# Patient Record
Sex: FEMALE | Race: BLACK OR AFRICAN AMERICAN | Employment: UNEMPLOYED | RURAL
[De-identification: names, ages, dates, MRNs, and addresses within clinical notes are randomized per-mention and may not be internally consistent; named-entity substitution may affect disease eponyms.]

---

## 2017-02-09 ENCOUNTER — OFFICE VISIT (OUTPATIENT)
Dept: FAMILY MEDICINE CLINIC | Age: 52
End: 2017-02-09

## 2017-02-09 VITALS
TEMPERATURE: 98.3 F | DIASTOLIC BLOOD PRESSURE: 114 MMHG | SYSTOLIC BLOOD PRESSURE: 180 MMHG | OXYGEN SATURATION: 98 % | RESPIRATION RATE: 18 BRPM | HEIGHT: 61 IN | WEIGHT: 191 LBS | HEART RATE: 75 BPM | BODY MASS INDEX: 36.06 KG/M2

## 2017-02-09 DIAGNOSIS — I10 ESSENTIAL HYPERTENSION: ICD-10-CM

## 2017-02-09 DIAGNOSIS — Q85.00 NEUROFIBROMATOSIS (HCC): ICD-10-CM

## 2017-02-09 DIAGNOSIS — Z12.39 BREAST CANCER SCREENING: Primary | ICD-10-CM

## 2017-02-09 DIAGNOSIS — E03.9 ACQUIRED HYPOTHYROIDISM: ICD-10-CM

## 2017-02-09 DIAGNOSIS — K21.9 GASTROESOPHAGEAL REFLUX DISEASE WITHOUT ESOPHAGITIS: ICD-10-CM

## 2017-02-09 DIAGNOSIS — E11.9 CONTROLLED TYPE 2 DIABETES MELLITUS WITHOUT COMPLICATION, WITHOUT LONG-TERM CURRENT USE OF INSULIN (HCC): ICD-10-CM

## 2017-02-09 DIAGNOSIS — M05.79 RHEUMATOID ARTHRITIS INVOLVING MULTIPLE SITES WITH POSITIVE RHEUMATOID FACTOR (HCC): ICD-10-CM

## 2017-02-09 DIAGNOSIS — E78.2 MIXED HYPERLIPIDEMIA: ICD-10-CM

## 2017-02-09 RX ORDER — METOPROLOL TARTRATE 50 MG/1
50 TABLET ORAL 2 TIMES DAILY
Qty: 60 TAB | Refills: 5 | Status: SHIPPED | OUTPATIENT
Start: 2017-02-09 | End: 2017-08-31 | Stop reason: SDUPTHER

## 2017-02-09 RX ORDER — GABAPENTIN 600 MG/1
600 TABLET ORAL DAILY
Qty: 30 TAB | Refills: 5 | Status: SHIPPED | OUTPATIENT
Start: 2017-02-09 | End: 2017-10-04

## 2017-02-09 RX ORDER — LISINOPRIL AND HYDROCHLOROTHIAZIDE 20; 25 MG/1; MG/1
1 TABLET ORAL 2 TIMES DAILY
Qty: 60 TAB | Refills: 5 | Status: SHIPPED | OUTPATIENT
Start: 2017-02-09 | End: 2017-08-31 | Stop reason: SDUPTHER

## 2017-02-09 NOTE — MR AVS SNAPSHOT
Visit Information Date & Time Provider Department Dept. Phone Encounter #  
 2/9/2017 10:40 AM MD Dany Gutierrez  Primary Care [phone] [id number] Your Appointments 3/9/2017 11:00 AM  
Follow Up with MD Dany Gutierrez Primary Care [facility]) Appt Note: 4 week f/u  
 [address] [facility] [phone] [phone]  
  
   
 [address] [facility] [address] [phone] Upcoming Health Maintenance Date Due Hepatitis C Screening 1965 HEMOGLOBIN A1C Q6M 1965 LIPID PANEL Q1 1964 FOOT EXAM Q1 3/12/1974 MICROALBUMIN Q1 3/12/1974 EYE EXAM RETINAL OR DILATED Q1 3/12/1974 Pneumococcal 19-64 Medium Risk (1 of 1 - PPSV23) 3/12/1983 DTaP/Tdap/Td series (1 - Tdap) 3/12/1985 PAP AKA CERVICAL CYTOLOGY 3/12/1985 BREAST CANCER SCRN MAMMOGRAM 3/12/2014 FOBT Q 1 YEAR AGE 50-75 3/12/2014 INFLUENZA AGE 9 TO ADULT 8/1/2016 Allergies as of 2/9/2017  Review Complete On: 2/9/2017 By: Marisa Garcia RN Severity Noted Reaction Type Reactions Sulfa (Sulfonamide Antibiotics)  06/23/2014    Soni Velasquez [Fesoterodine]  07/25/2016    Other (comments) Chest pain Current Immunizations  Never Reviewed No immunizations on file. Not reviewed this visit You Were Diagnosed With   
  
 Codes Comments Breast cancer screening    -  Primary ICD-10-CM: Z12.39 
ICD-9-CM: V76.10 Essential hypertension     ICD-10-CM: I10 
ICD-9-CM: 401.9 Mixed hyperlipidemia     ICD-10-CM: E78.2 ICD-9-CM: 272.2 Controlled type 2 diabetes mellitus without complication, without long-term current use of insulin ([facility]     ICD-10-CM: E11.9 ICD-9-CM: 250.00 Neurofibromatosis ([facility]     ICD-10-CM: Q85.00 ICD-9-CM: 237.70 Rheumatoid arthritis involving multiple sites with positive rheumatoid factor (HCC)     ICD-10-CM: M05.79 ICD-9-CM: 714.0 Vitals BP Pulse Temp Resp Height(growth percentile) Weight(growth percentile) (!) 180/114 (BP 1 Location: Left arm, BP Patient Position: Sitting) 75 98.3 °F (36.8 °C) (Oral) 18 5' 1\" (1.549 m) 191 lb (86.6 kg) SpO2 BMI OB Status Smoking Status 98% 36.09 kg/m2 Hysterectomy Never Smoker Vitals History BMI and BSA Data Body Mass Index Body Surface Area 36.09 kg/m 2 1.93 m 2 Preferred Pharmacy Pharmacy Name Phone [facility] [address] [place] - [address] [phone] Your Updated Medication List  
  
   
This list is accurate as of: 2/9/17 11:54 AM.  Always use your most recent med list.  
  
  
  
  
 Thersia Brandonane -407-538 mg-mcg-mcg-mcg Tab Generic drug:  Multivit,Ca,Iron-FA-Lyco-Lut Take  by mouth. hydroCHLOROthiazide 25 mg tablet Commonly known as:  HYDRODIURIL Take 25 mg by mouth daily. metoprolol tartrate 50 mg tablet Commonly known as:  LOPRESSOR Take  by mouth daily. pantoprazole 40 mg tablet Commonly known as:  PROTONIX Take 40 mg by mouth daily. traMADol 50 mg tablet Commonly known as:  ULTRAM  
Take 50 mg by mouth every six (6) hours as needed for Pain. VITAMIN D3 1,000 unit Cap Generic drug:  cholecalciferol Take  by mouth. To-Do List   
 04/09/2017 Imaging:  [facility] MAMMO BI SCREENING INCL CAD Introducing [facility]! Jennifer Arroyo introduces [employer] patient portal. Now you can access parts of your medical record, email your doctor's office, and request medication refills online. 1. In your internet browser, go to [email or web address] 2. Click on the First Time User? Click Here link in the Sign In box. You will see the New Member Sign Up page. 3. Enter your [employer] Access Code exactly as it appears below. You will not need to use this code after youve completed the sign-up process.  If you do not sign up before the expiration date, you must request a new code. · [employer] Access Code: [email or web address] Expires: 5/10/2017 11:54 AM 
 
4. Enter the last four digits of your Social Security Number (xxxx) and Date of Birth (mm/dd/yyyy) as indicated and click Submit. You will be taken to the next sign-up page. 5. Create a [employer] ID. This will be your [employer] login ID and cannot be changed, so think of one that is secure and easy to remember. 6. Create a [employer] password. You can change your password at any time. 7. Enter your Password Reset Question and Answer. This can be used at a later time if you forget your password. 8. Enter your e-mail address. You will receive e-mail notification when new information is available in [address] 9. Click Sign Up. You can now view and download portions of your medical record. 10. Click the Download Summary menu link to download a portable copy of your medical information. If you have questions, please visit the Frequently Asked Questions section of the [employer] website. Remember, [employer] is NOT to be used for urgent needs. For medical emergencies, dial 911. Now available from your iPhone and Android! Please provide this summary of care documentation to your next provider. Your primary care clinician is listed as Yoli Stewart. If you have any questions after today's visit, please call [phone].

## 2017-02-10 ENCOUNTER — DOCUMENTATION ONLY (OUTPATIENT)
Dept: FAMILY MEDICINE CLINIC | Age: 52
End: 2017-02-10

## 2017-02-10 NOTE — PROGRESS NOTES
Patient has appt scheduled for mammo on 2-14-17 at 1400 at [facility]. Patient aware of appt info.

## 2017-03-09 ENCOUNTER — OFFICE VISIT (OUTPATIENT)
Dept: FAMILY MEDICINE CLINIC | Age: 52
End: 2017-03-09

## 2017-03-09 VITALS
WEIGHT: 196 LBS | HEIGHT: 61 IN | HEART RATE: 63 BPM | TEMPERATURE: 97.6 F | SYSTOLIC BLOOD PRESSURE: 160 MMHG | RESPIRATION RATE: 16 BRPM | BODY MASS INDEX: 37 KG/M2 | OXYGEN SATURATION: 100 % | DIASTOLIC BLOOD PRESSURE: 96 MMHG

## 2017-03-09 DIAGNOSIS — E53.8 B12 DEFICIENCY: ICD-10-CM

## 2017-03-09 DIAGNOSIS — I10 ESSENTIAL HYPERTENSION: Primary | ICD-10-CM

## 2017-03-09 DIAGNOSIS — M05.79 RHEUMATOID ARTHRITIS INVOLVING MULTIPLE SITES WITH POSITIVE RHEUMATOID FACTOR (HCC): ICD-10-CM

## 2017-03-09 DIAGNOSIS — E55.9 VITAMIN D DEFICIENCY: ICD-10-CM

## 2017-03-09 DIAGNOSIS — E03.9 ACQUIRED HYPOTHYROIDISM: ICD-10-CM

## 2017-03-09 DIAGNOSIS — Q85.00 NEUROFIBROMATOSIS (HCC): ICD-10-CM

## 2017-03-09 DIAGNOSIS — E78.2 MIXED HYPERLIPIDEMIA: ICD-10-CM

## 2017-03-09 DIAGNOSIS — Z12.39 BREAST CANCER SCREENING: ICD-10-CM

## 2017-03-09 DIAGNOSIS — E11.9 CONTROLLED TYPE 2 DIABETES MELLITUS WITHOUT COMPLICATION, WITHOUT LONG-TERM CURRENT USE OF INSULIN (HCC): ICD-10-CM

## 2017-03-09 NOTE — PROGRESS NOTES
Rolo Mar is a 45 y.o. female who presents with the following:  Chief Complaint   Patient presents with    Hypertension     patient states she did not take her meds today    Labs       Hypertension    The history is provided by the patient (The patient is having no chest pain shortness of breath nor edema and her blood pressure is up considerably today but she did not take her medicine as she was fasting for laboratory). Pertinent negatives include no chest pain, no orthopnea, no palpitations, no PND, no headaches, no peripheral edema, no dizziness and no shortness of breath. Labs   Pertinent negatives include no chest pain, no headaches and no shortness of breath. Allergies   Allergen Reactions    Sulfa (Sulfonamide Antibiotics) Hives    Toviaz [Fesoterodine] Other (comments)     Chest pain       Current Outpatient Prescriptions   Medication Sig    lisinopril-hydroCHLOROthiazide (PRINZIDE, ZESTORETIC) 20-25 mg per tablet Take 1 Tab by mouth two (2) times a day.  metoprolol tartrate (LOPRESSOR) 50 mg tablet Take 1 Tab by mouth two (2) times a day.  gabapentin (NEURONTIN) 600 mg tablet Take 1 Tab by mouth daily. At bedtime    pantoprazole (PROTONIX) 40 mg tablet Take 40 mg by mouth daily.  traMADol (ULTRAM) 50 mg tablet Take 50 mg by mouth every six (6) hours as needed for Pain.  Cholecalciferol, Vitamin D3, (VITAMIN D3) 1,000 unit cap Take  by mouth.  Multivit,Ca,Iron-FA-Lyco-Lut (CENTRAL-DANIEL) -876-250 mg-mcg-mcg-mcg tab Take  by mouth. No current facility-administered medications for this visit.         Past Medical History:   Diagnosis Date    Arthritis     Essential hypertension     GERD (gastroesophageal reflux disease)     Hyperlipidemia     Neurofibromatosis ([facility]     Pancreatitis        Past Surgical History:   Procedure Laterality Date    HX CARPAL TUNNEL RELEASE      HX SHOULDER ARTHROSCOPY Bilateral     HX TAY AND BSO         Family History   Problem Relation Age of Onset    Other Mother      neurofibromatosis and MR    Heart Disease Father     Other Brother      MR    Thyroid Cancer Child        Social History     Social History    Marital status:      Spouse name: N/A    Number of children: N/A    Years of education: N/A     Social History Main Topics    Smoking status: Never Smoker    Smokeless tobacco: Never Used    Alcohol use No    Drug use: No    Sexual activity: Not on file     Other Topics Concern    Not on file     Social History Narrative       Review of Systems   Respiratory: Negative for shortness of breath. Cardiovascular: Negative for chest pain, palpitations, orthopnea and PND. Neurological: Negative for dizziness and headaches. Visit Vitals    BP (!) 160/96 (BP 1 Location: Left arm, BP Patient Position: Sitting)    Pulse 63    Temp 97.6 °F (36.4 °C)    Resp 16    Ht 5' 1\" (1.549 m)    Wt 196 lb (88.9 kg)    SpO2 100%    BMI 37.03 kg/m2     Physical Exam   Constitutional: She is oriented to person, place, and time and well-developed, well-nourished, and in no distress. HENT:   Head: Normocephalic and atraumatic. Right Ear: External ear normal.   Left Ear: External ear normal.   Mouth/Throat: Oropharynx is clear and moist.   Eyes: Conjunctivae and EOM are normal. Pupils are equal, round, and reactive to light. Right eye exhibits no discharge. Left eye exhibits no discharge. Neck: Normal range of motion. Neck supple. No tracheal deviation present. No thyromegaly present. Cardiovascular: Normal rate, regular rhythm, normal heart sounds and intact distal pulses. Exam reveals no gallop and no friction rub. No murmur heard. Pulmonary/Chest: Effort normal and breath sounds normal. No respiratory distress. She has no wheezes. She exhibits no tenderness. Abdominal: Soft. Bowel sounds are normal. She exhibits no distension and no mass. There is no tenderness. There is no rebound and no guarding. Musculoskeletal: She exhibits no edema or tenderness. Lymphadenopathy:     She has no cervical adenopathy. Neurological: She is alert and oriented to person, place, and time. She has normal reflexes. No cranial nerve deficit. She exhibits normal muscle tone. Gait normal. Coordination normal.   Skin: Skin is warm and dry. No rash noted. No erythema. No pallor. the patient has a multitude of neurofibromas over all of her skin   Psychiatric: Mood, memory, affect and judgment normal.         ICD-10-CM ICD-9-CM    1. Essential hypertension I10 401.9 LIPID PANEL      METABOLIC PANEL, COMPREHENSIVE   2. Controlled type 2 diabetes mellitus without complication, without long-term current use of insulin (HCC) E11.9 250.00 LIPID PANEL      METABOLIC PANEL, COMPREHENSIVE      HEMOGLOBIN A1C WITH EAG      MICROALBUMIN, UR, RAND W/ MICROALBUMIN/CREA RATIO   3. B12 deficiency E53.8 266.2 VITAMIN B12 & FOLATE   4. Acquired hypothyroidism E03.9 244.9 THYROID PANEL W/TSH   5. Vitamin D deficiency E55.9 268.9    6. Mixed hyperlipidemia E78.2 272.2 LIPID PANEL      METABOLIC PANEL, COMPREHENSIVE   7. Rheumatoid arthritis involving multiple sites with positive rheumatoid factor ([facility]) M05.79 714.0 RHEUMATOID FACTOR, QL      AUGUSTUS, DIRECT, W/REFLEX      CRP, HIGH SENSITIVITY      SED RATE (ESR)   8. Neurofibromatosis ([facility] [address] [facility] Q85.00 237.70    9.  Breast cancer screening Z12.39 V76.10 [facility] MAMMO BI SCREENING INCL CAD       Orders Placed This Encounter    VITAMIN B12 & FOLATE    RHEUMATOID FACTOR, QL    AUGUSTUS, DIRECT, W/REFLEX    CRP, HIGH SENSITIVITY    SED RATE (ESR)       Follow-up Disposition: Not on Nayan Caldera MD

## 2017-03-10 LAB
ALBUMIN SERPL-MCNC: 4.5 G/DL (ref 3.5–5.5)
ALBUMIN/GLOB SERPL: 1.3 {RATIO} (ref 1.1–2.5)
ALP SERPL-CCNC: 56 IU/L (ref 39–117)
ALT SERPL-CCNC: 9 IU/L (ref 0–32)
ANA SER QL: NEGATIVE
AST SERPL-CCNC: 8 IU/L (ref 0–40)
BILIRUB SERPL-MCNC: 0.9 MG/DL (ref 0–1.2)
BUN SERPL-MCNC: 15 MG/DL (ref 6–24)
BUN/CREAT SERPL: 21 (ref 9–23)
CALCIUM SERPL-MCNC: 9.3 MG/DL (ref 8.7–10.2)
CHLORIDE SERPL-SCNC: 99 MMOL/L (ref 96–106)
CHOLEST SERPL-MCNC: 302 MG/DL (ref 100–199)
CO2 SERPL-SCNC: 26 MMOL/L (ref 18–29)
COMMENT, 011824: ABNORMAL
CREAT SERPL-MCNC: 0.72 MG/DL (ref 0.57–1)
CREAT UR-MCNC: NORMAL MG/DL
CRP SERPL HS-MCNC: 10.85 MG/L (ref 0–3)
ERYTHROCYTE [SEDIMENTATION RATE] IN BLOOD BY WESTERGREN METHOD: 49 MM/HR (ref 0–40)
EST. AVERAGE GLUCOSE BLD GHB EST-MCNC: 117 MG/DL
FOLATE SERPL-MCNC: 16.2 NG/ML
FT4I SERPL CALC-MCNC: 2.1 (ref 1.2–4.9)
GLOBULIN SER CALC-MCNC: 3.4 G/DL (ref 1.5–4.5)
GLUCOSE SERPL-MCNC: 95 MG/DL (ref 65–99)
HBA1C MFR BLD: 5.7 % (ref 4.8–5.6)
HDLC SERPL-MCNC: 47 MG/DL
INTERPRETATION, 910389: NORMAL
LDLC SERPL CALC-MCNC: 237 MG/DL (ref 0–99)
MICROALBUMIN UR-MCNC: NORMAL
POTASSIUM SERPL-SCNC: 3.9 MMOL/L (ref 3.5–5.2)
PROT SERPL-MCNC: 7.9 G/DL (ref 6–8.5)
RHEUMATOID FACT SERPL-ACNC: 10.6 IU/ML (ref 0–13.9)
SODIUM SERPL-SCNC: 140 MMOL/L (ref 134–144)
T3RU NFR SERPL: 31 % (ref 24–39)
T4 SERPL-MCNC: 6.7 UG/DL (ref 4.5–12)
TRIGL SERPL-MCNC: 90 MG/DL (ref 0–149)
TSH SERPL DL<=0.005 MIU/L-ACNC: 0.96 UIU/ML (ref 0.45–4.5)
VIT B12 SERPL-MCNC: 596 PG/ML (ref 211–946)
VLDLC SERPL CALC-MCNC: 18 MG/DL (ref 5–40)

## 2017-03-28 NOTE — PROGRESS NOTES
Unable to reach patient. No working numbers at this time. Printed results and info and have mailed to patient.

## 2017-08-31 DIAGNOSIS — I10 ESSENTIAL HYPERTENSION: ICD-10-CM

## 2017-08-31 RX ORDER — LISINOPRIL AND HYDROCHLOROTHIAZIDE 20; 25 MG/1; MG/1
1 TABLET ORAL 2 TIMES DAILY
Qty: 60 TAB | Refills: 5 | Status: SHIPPED | OUTPATIENT
Start: 2017-08-31 | End: 2018-04-20 | Stop reason: SDUPTHER

## 2017-08-31 RX ORDER — METOPROLOL TARTRATE 50 MG/1
50 TABLET ORAL 2 TIMES DAILY
Qty: 60 TAB | Refills: 5 | Status: SHIPPED | OUTPATIENT
Start: 2017-08-31 | End: 2018-04-20 | Stop reason: SDUPTHER

## 2018-04-20 ENCOUNTER — OFFICE VISIT (OUTPATIENT)
Dept: FAMILY MEDICINE CLINIC | Age: 53
End: 2018-04-20

## 2018-04-20 VITALS
RESPIRATION RATE: 18 BRPM | WEIGHT: 187.6 LBS | DIASTOLIC BLOOD PRESSURE: 100 MMHG | HEIGHT: 61 IN | OXYGEN SATURATION: 99 % | BODY MASS INDEX: 35.42 KG/M2 | SYSTOLIC BLOOD PRESSURE: 160 MMHG | HEART RATE: 70 BPM

## 2018-04-20 DIAGNOSIS — E78.2 MIXED HYPERLIPIDEMIA: ICD-10-CM

## 2018-04-20 DIAGNOSIS — I10 ESSENTIAL HYPERTENSION: ICD-10-CM

## 2018-04-20 DIAGNOSIS — E03.9 ACQUIRED HYPOTHYROIDISM: ICD-10-CM

## 2018-04-20 DIAGNOSIS — H90.3 SENSORINEURAL HEARING LOSS (SNHL) OF BOTH EARS: ICD-10-CM

## 2018-04-20 DIAGNOSIS — Z12.39 BREAST CANCER SCREENING: ICD-10-CM

## 2018-04-20 DIAGNOSIS — Z11.59 ENCOUNTER FOR HEPATITIS C SCREENING TEST FOR LOW RISK PATIENT: ICD-10-CM

## 2018-04-20 DIAGNOSIS — E11.9 CONTROLLED TYPE 2 DIABETES MELLITUS WITHOUT COMPLICATION, WITHOUT LONG-TERM CURRENT USE OF INSULIN (HCC): ICD-10-CM

## 2018-04-20 DIAGNOSIS — E66.01 SEVERE OBESITY (BMI 35.0-39.9) WITH COMORBIDITY (HCC): ICD-10-CM

## 2018-04-20 DIAGNOSIS — Q85.00 NEUROFIBROMATOSIS (HCC): ICD-10-CM

## 2018-04-20 DIAGNOSIS — K21.9 GASTROESOPHAGEAL REFLUX DISEASE WITHOUT ESOPHAGITIS: Primary | ICD-10-CM

## 2018-04-20 DIAGNOSIS — M05.79 RHEUMATOID ARTHRITIS INVOLVING MULTIPLE SITES WITH POSITIVE RHEUMATOID FACTOR (HCC): ICD-10-CM

## 2018-04-20 DIAGNOSIS — E55.9 VITAMIN D DEFICIENCY: ICD-10-CM

## 2018-04-20 DIAGNOSIS — E53.8 B12 DEFICIENCY: ICD-10-CM

## 2018-04-20 RX ORDER — LISINOPRIL AND HYDROCHLOROTHIAZIDE 20; 25 MG/1; MG/1
1 TABLET ORAL 2 TIMES DAILY
Qty: 60 TAB | Refills: 5 | Status: SHIPPED | OUTPATIENT
Start: 2018-04-20 | End: 2019-01-18 | Stop reason: SDUPTHER

## 2018-04-20 RX ORDER — GABAPENTIN 600 MG/1
600 TABLET ORAL
Qty: 30 TAB | Refills: 5 | Status: SHIPPED | OUTPATIENT
Start: 2018-04-20 | End: 2018-05-20

## 2018-04-20 RX ORDER — METOPROLOL TARTRATE 50 MG/1
50 TABLET ORAL 2 TIMES DAILY
Qty: 60 TAB | Refills: 5 | Status: SHIPPED | OUTPATIENT
Start: 2018-04-20 | End: 2018-09-16

## 2018-04-20 RX ORDER — OMEPRAZOLE 40 MG/1
40 CAPSULE, DELAYED RELEASE ORAL DAILY
Qty: 90 CAP | Refills: 1 | Status: SHIPPED | OUTPATIENT
Start: 2018-04-20 | End: 2018-09-16

## 2018-04-20 NOTE — PROGRESS NOTES
Desiree Adrian is a 46 y.o. female who presents with the following:  Chief Complaint   Patient presents with    Hypertension    Fatigue     Not on thyroid medicine    GERD    Diabetes    Cholesterol Problem    Arthritis    Neurologic Problem     Neurofibromatosis    Thyroid Problem       Hypertension    The history is provided by the patient (Patient is doing well on her medications although she is now out of her medicine but she is having no chest pain shortness of breath nor edema. ). Pertinent negatives include no chest pain, no orthopnea, no palpitations, no malaise/fatigue, no blurred vision, no headaches, no tinnitus, no dizziness and no shortness of breath. Fatigue   The history is provided by the patient (Patient's aches and pains from her arthritis is keeping her awake at night and additionally she is not taking any thyroid medicine for her hypothyroid condition. ). Pertinent negatives include no chest pain, no abdominal pain, no headaches and no shortness of breath. GERD   The history is provided by the patient (Patient's reflux is generally handled nicely with her omeprazole so she is not having chest pain from this.). Pertinent negatives include no chest pain, no abdominal pain, no headaches and no shortness of breath. Diabetes   The history is provided by the patient (The patient is not having any polyuria or abnormal weight loss but she is not checking her blood sugars either because of finances. ). Pertinent negatives include no chest pain, no abdominal pain, no headaches and no shortness of breath. Cholesterol Problem   The history is provided by the patient (Patient has a history of hyperlipidemia but is currently on no medications but is not having any chest pain. ). Pertinent negatives include no chest pain, no abdominal pain, no headaches and no shortness of breath.    Arthritis   The history is provided by the patient (Patient has both osteoarthritis and rheumatoid arthritis in the aches and pains are keeping her awake at night.). Pertinent negatives include no chest pain, no abdominal pain, no headaches and no shortness of breath. Neurologic Problem   The history is provided by the patient (Patient has carpal tunnel tunnel that is symptomatic even though she has had the surgery. Patient also has neurofibromatosis 1 of which is fairly large on her left heel that is concerning because of her diabetes. ). Pertinent negatives include no shortness of breath, no chest pain and no headaches. Thyroid Problem   The history is provided by the patient (Patient is having fatigue and is on no thyroid medication although there is a history of hypothyroidism. ). Pertinent negatives include no chest pain, no abdominal pain, no headaches and no shortness of breath. Hearing Problem    The history is provided by the patient (Patient is having decreased hearing and worse on the right). Pertinent negatives include no headaches, no hearing loss, no sore throat, no abdominal pain, no cough and no rash. Allergies   Allergen Reactions    Sulfa (Sulfonamide Antibiotics) Hives    Toviaz [Fesoterodine] Other (comments)     Chest pain       Current Outpatient Prescriptions   Medication Sig    metoprolol tartrate (LOPRESSOR) 50 mg tablet Take 1 Tab by mouth two (2) times a day.  lisinopril-hydroCHLOROthiazide (PRINZIDE, ZESTORETIC) 20-25 mg per tablet Take 1 Tab by mouth two (2) times a day.  omeprazole (PRILOSEC) 40 mg capsule Take 1 Cap by mouth daily.  Cholecalciferol, Vitamin D3, (VITAMIN D3) 1,000 unit cap Take  by mouth.  ondansetron (ZOFRAN ODT) 4 mg disintegrating tablet Take 1 Tab by mouth every eight (8) hours as needed for Nausea.  Multivit,Ca,Iron-FA-Lyco-Lut (CENTRAL-DANIEL) -417-250 mg-mcg-mcg-mcg tab Take  by mouth. No current facility-administered medications for this visit.         Past Medical History:   Diagnosis Date    Arthritis     Essential hypertension     GERD (gastroesophageal reflux disease)     Hyperlipidemia     Neurofibromatosis ([facility] [facility]     Pancreatitis        Past Surgical History:   Procedure Laterality Date    HX CARPAL TUNNEL RELEASE      HX SHOULDER ARTHROSCOPY Bilateral     HX TAY AND BSO         Family History   Problem Relation Age of Onset    Other Mother      neurofibromatosis and MR    Heart Disease Father     Other Brother      MR    Thyroid Cancer Child        Social History     Social History    Marital status:      Spouse name: N/A    Number of children: N/A    Years of education: N/A     Social History Main Topics    Smoking status: Never Smoker    Smokeless tobacco: Never Used    Alcohol use No    Drug use: No    Sexual activity: Not Asked     Other Topics Concern    None     Social History Narrative       Review of Systems   Constitutional: Positive for fatigue. Negative for chills, fever, malaise/fatigue and weight loss. HENT: Negative for congestion, hearing loss, sore throat and tinnitus. Eyes: Negative for blurred vision, pain and discharge. Respiratory: Negative for cough, shortness of breath and wheezing. Cardiovascular: Negative for chest pain, palpitations, orthopnea, claudication and leg swelling. Gastrointestinal: Negative for abdominal pain, constipation and heartburn. Genitourinary: Negative for dysuria, frequency and urgency. Musculoskeletal: Negative for falls, joint pain and myalgias. Skin: Negative for itching and rash. Patient's skin are covered with neurofibromas. Neurological: Positive for tingling (Patient is tingling in the central fingers bilaterally from her carpal tunnel). Negative for dizziness, tremors and headaches. Neurofibroma on the left lateral heel is putting pressure on that area giving her some difficulty. Endo/Heme/Allergies: Negative for environmental allergies and polydipsia. Psychiatric/Behavioral: Negative for depression and substance abuse.  The patient is not nervous/anxious. Visit Vitals    BP (!) 160/100 (BP 1 Location: Left arm, BP Patient Position: Sitting)    Pulse 70    Resp 18    Ht 5' 1\" (1.549 m)    Wt 187 lb 9.6 oz (85.1 kg)    SpO2 99%    BMI 35.45 kg/m2     Physical Exam   Constitutional: She is oriented to person, place, and time and well-developed, well-nourished, and in no distress. HENT:   Head: Normocephalic and atraumatic. Right Ear: External ear normal.   Left Ear: External ear normal.   Mouth/Throat: Oropharynx is clear and moist.   Eyes: Conjunctivae and EOM are normal. Pupils are equal, round, and reactive to light. Right eye exhibits no discharge. Left eye exhibits no discharge. Neck: Normal range of motion. Neck supple. No tracheal deviation present. No thyromegaly present. Cardiovascular: Normal rate, regular rhythm, normal heart sounds and intact distal pulses. Exam reveals no gallop and no friction rub. No murmur heard. Pulmonary/Chest: Effort normal and breath sounds normal. No respiratory distress. She has no wheezes. She exhibits no tenderness. Abdominal: Soft. Bowel sounds are normal. She exhibits no distension and no mass. There is no tenderness. There is no rebound and no guarding. Musculoskeletal: She exhibits tenderness (About the wrist and knees). She exhibits no edema. Lymphadenopathy:     She has no cervical adenopathy. Neurological: She is alert and oriented to person, place, and time. She has normal reflexes. No cranial nerve deficit. She exhibits normal muscle tone. Gait normal. Coordination normal.   Skin: Skin is warm and dry. No rash noted. No erythema. No pallor. Patient's skin is covered with 100s of neurofibromas from head to foot   Psychiatric: Mood, memory, affect and judgment normal.         ICD-10-CM ICD-9-CM    1. Gastroesophageal reflux disease without esophagitis K21.9 530.81 omeprazole (PRILOSEC) 40 mg capsule   2.  Essential hypertension I10 401.9 metoprolol tartrate (LOPRESSOR) 50 mg tablet      lisinopril-hydroCHLOROthiazide (PRINZIDE, ZESTORETIC) 20-25 mg per tablet   3. Mixed hyperlipidemia E78.2 272.2    4. Breast cancer screening Z12.31 V76.10 [facility] MAMMO BI SCREENING INCL CAD   5. Severe obesity (BMI 35.0-39. 9) with comorbidity ([facility] E66.01 278.01    6. Controlled type 2 diabetes mellitus without complication, without long-term current use of insulin (HCC) G03.2 156.12 METABOLIC PANEL, COMPREHENSIVE      LIPID PANEL      HEMOGLOBIN A1C WITH EAG      MICROALBUMIN, UR, RAND W/ MICROALB/CREAT RATIO       DIABETES FOOT EXAM   7. Rheumatoid arthritis involving multiple sites with positive rheumatoid factor (HCC) M05.79 714.0 SED RATE (ESR)   8. Neurofibromatosis ([facility] Q85.00 237.70    9. B12 deficiency E53.8 266.2 METHYLMALONIC ACID   10. Acquired hypothyroidism E03.9 244.9 THYROID PANEL W/TSH   11. Vitamin D deficiency E55.9 268.9 VITAMIN D, 1, 25 DIHYDROXY   12. Encounter for hepatitis C screening test for low risk patient Z11.59 V73.89 HEPATITIS C AB       Orders Placed This Encounter    [facility] MAMMO BI SCREENING INCL CAD     Standing Status:   Future     Standing Expiration Date:   5/20/2019     Order Specific Question:   Reason for Exam     Answer:   Breast cancer screening    HEPATITIS C AB    METABOLIC PANEL, COMPREHENSIVE    LIPID PANEL    HEMOGLOBIN A1C WITH EAG    MICROALBUMIN, UR, RAND W/ MICROALB/CREAT RATIO    THYROID PANEL W/TSH    METHYLMALONIC ACID    SED RATE (ESR)    VITAMIN D, 1, 25 DIHYDROXY     DIABETES FOOT EXAM    metoprolol tartrate (LOPRESSOR) 50 mg tablet     Sig: Take 1 Tab by mouth two (2) times a day. Dispense:  60 Tab     Refill:  5    lisinopril-hydroCHLOROthiazide (PRINZIDE, ZESTORETIC) 20-25 mg per tablet     Sig: Take 1 Tab by mouth two (2) times a day. Dispense:  60 Tab     Refill:  5    omeprazole (PRILOSEC) 40 mg capsule     Sig: Take 1 Cap by mouth daily.      Dispense:  90 Cap     Refill:  1   Diabetic foot exam was carried out revealing many neurofibromas all over each foot and including the toes with normal sensation and normal dorsalis pedis bilaterally but a very thin posterior tibial bilaterally. Patient had mild hammertoes bilaterally but no other gross orthopedic deformities. The patient did have mild toenail fungus bilaterally. There were no pre-ulcerative calluses at this time. It is of note that the neurofibroma on the left heel is rather large and may need to be removed to take pressure off this area.     Follow-up Disposition: Not on Ari Helm MD

## 2018-04-20 NOTE — MR AVS SNAPSHOT
[address] 67 [phone] Patient: Janeth Romo MRN: [id number] :1965 Visit Information Date & Time Provider Department Dept. Phone Encounter #  
 2018  7:40 AM Juan Pablo Solis  [address] [facility] [phone] [id number] Upcoming Health Maintenance Date Due Hepatitis C Screening 1965 FOOT EXAM Q1 3/12/1974 EYE EXAM RETINAL OR DILATED Q1 3/12/1974 Pneumococcal 19-64 Medium Risk (1 of 1 - PPSV23) 3/12/1983 DTaP/Tdap/Td series (1 - Tdap) 3/12/1985 PAP AKA CERVICAL CYTOLOGY 3/12/1985 BREAST CANCER SCRN MAMMOGRAM 3/12/2014 FOBT Q 1 YEAR AGE 50-75 3/12/2014 HEMOGLOBIN A1C Q6M 2017 MICROALBUMIN Q1 3/9/2018 LIPID PANEL Q1 3/9/2018 Influenza Age 5 to Adult 2020* *Topic was postponed. The date shown is not the original due date. Allergies as of 2018  Review Complete On: 2018 By: Juan Pablo Solis MD  
  
 Severity Noted Reaction Type Reactions Sulfa (Sulfonamide Antibiotics)  2014    Hives Runell Prosper [Fesoterodine]  2016    Other (comments) Chest pain Current Immunizations  Never Reviewed No immunizations on file. Not reviewed this visit You Were Diagnosed With   
  
 Codes Comments Gastroesophageal reflux disease without esophagitis    -  Primary ICD-10-CM: K21.9 ICD-9-CM: 530.81 Essential hypertension     ICD-10-CM: I10 
ICD-9-CM: 401.9 Mixed hyperlipidemia     ICD-10-CM: E78.2 ICD-9-CM: 272.2 Breast cancer screening     ICD-10-CM: Z12.31 
ICD-9-CM: V76.10 Severe obesity (BMI 35.0-39. 9) with comorbidity ([facility]     ICD-10-CM: E66.01 
ICD-9-CM: 278.01 Controlled type 2 diabetes mellitus without complication, without long-term current use of insulin ([facility]     ICD-10-CM: E11.9 ICD-9-CM: 250.00 Rheumatoid arthritis involving multiple sites with positive rheumatoid factor (HCC)     ICD-10-CM: M05.79 ICD-9-CM: 714.0 Neurofibromatosis ([facility]     ICD-10-CM: Q85.00 ICD-9-CM: 237.70   
 B12 deficiency     ICD-10-CM: E53.8 ICD-9-CM: 266.2 Acquired hypothyroidism     ICD-10-CM: E03.9 ICD-9-CM: 524. 9 Vitamin D deficiency     ICD-10-CM: E55.9 ICD-9-CM: 268.9 Encounter for hepatitis C screening test for low risk patient     ICD-10-CM: Z11.59 
ICD-9-CM: V73.89 Vitals BP Pulse Resp Height(growth percentile) Weight(growth percentile) SpO2  
 (!) 160/100 (BP 1 Location: Left arm, BP Patient Position: Sitting) 70 18 5' 1\" (1.549 m) 187 lb 9.6 oz (85.1 kg) 99% BMI OB Status Smoking Status 35.45 kg/m2 Hysterectomy Never Smoker Vitals History BMI and BSA Data Body Mass Index Body Surface Area  
 35.45 kg/m 2 1.91 m 2 [facility] [address] [phone] Your Updated Medication List  
  
   
This list is accurate as of 4/20/18  8:37 AM.  Always use your most recent med list.  
  
  
  
  
 Abhinav Bose -[phone] mg-mcg-mcg-mcg Tab Generic drug:  Multivit,Ca,Iron-FA-Lyco-Lut Take  by mouth.  
  
 gabapentin 600 mg tablet Commonly known as:  NEURONTIN Take 1 Tab by mouth nightly as needed for up to 30 days. Indications: NEUROPATHIC PAIN  
  
 lisinopril-hydroCHLOROthiazide 20-25 mg per tablet Commonly known as:  Richar Scarlet Take 1 Tab by mouth two (2) times a day. metoprolol tartrate 50 mg tablet Commonly known as:  LOPRESSOR Take 1 Tab by mouth two (2) times a day. omeprazole 40 mg capsule Commonly known as:  PRILOSEC Take 1 Cap by mouth daily. ondansetron 4 mg disintegrating tablet Commonly known as:  ZOFRAN ODT Take 1 Tab by mouth every eight (8) hours as needed for Nausea. VITAMIN D3 1,000 unit Cap Generic drug:  cholecalciferol Take  by mouth. Prescriptions Sent to Pharmacy Refills  
 metoprolol tartrate (LOPRESSOR) 50 mg tablet 5 Sig: Take 1 Tab by mouth two (2) times a day. Class: Normal  
 Pharmacy: [address] Ph #: [phone] Route: Oral  
 lisinopril-hydroCHLOROthiazide (PRINZIDE, ZESTORETIC) 20-25 mg per tablet 5 Sig: Take 1 Tab by mouth two (2) times a day. Class: Normal  
 Pharmacy: [address] Ph #: [phone] Route: Oral  
 omeprazole (PRILOSEC) 40 mg capsule 1 Sig: Take 1 Cap by mouth daily. Class: Normal  
 Pharmacy: [address], [address] [facility] [address] Ph #: [phone] Route: Oral  
 gabapentin (NEURONTIN) 600 mg tablet 5 Sig: Take 1 Tab by mouth nightly as needed for up to 30 days. Indications: NEUROPATHIC PAIN Class: Normal  
 Pharmacy: [address] Ph #: [phone] Route: Oral  
  
We Performed the Following HEMOGLOBIN A1C WITH EAG [20144 CPT(R)] HEPATITIS C AB [73500 CPT(R)]  DIABETES FOOT EXAM [HM7 Custom] LIPID PANEL [43333 CPT(R)] METABOLIC PANEL, COMPREHENSIVE [67438 CPT(R)] METHYLMALONIC ACID [88032 CPT(R)] MICROALBUMIN, UR, RAND W/ MICROALB/CREAT RATIO [id number] CPT(R)] SED RATE (ESR) G9192787 CPT(R)] THYROID PANEL W/TSH [31012 CPT(R)] VITAMIN D, 1, 25 DIHYDROXY [99043 CPT(R)] To-Do List   
 07/20/2018 Imaging:  KYE MAMMO BI SCREENING INCL CAD Introducing [facility]! [facility] introduces Loopback patient portal. Now you can access parts of your medical record, email your doctor's office, and request medication refills online. 1. In your internet browser, go to [email or web address] 2. Click on the First Time User? Click Here link in the Sign In box. You will see the New Member Sign Up page. 3. Enter your Freespee Access Code exactly as it appears below. You will not need to use this code after youve completed the sign-up process. If you do not sign up before the expiration date, you must request a new code. · Freespee Access Code: [email or web address] Expires: 7/8/2018  3:21 PM 
 
4. Enter the last four digits of your Social Security Number (xxxx) and Date of Birth (mm/dd/yyyy) as indicated and click Submit. You will be taken to the next sign-up page. 5. Create a Freespee ID. This will be your Freespee login ID and cannot be changed, so think of one that is secure and easy to remember. 6. Create a Freespee password. You can change your password at any time. 7. Enter your Password Reset Question and Answer. This can be used at a later time if you forget your password. 8. Enter your e-mail address. You will receive e-mail notification when new information is available in [address] 9. Click Sign Up. You can now view and download portions of your medical record. 10. Click the Download Summary menu link to download a portable copy of your medical information. If you have questions, please visit the Frequently Asked Questions section of the Freespee website. Remember, Freespee is NOT to be used for urgent needs. For medical emergencies, dial 911. Now available from your iPhone and Android! Please provide this summary of care documentation to your next provider. Your primary care clinician is listed as Dale Quintero. If you have any questions after today's visit, please call [phone].

## 2018-04-23 ENCOUNTER — TELEPHONE (OUTPATIENT)
Dept: FAMILY MEDICINE CLINIC | Age: 53
End: 2018-04-23

## 2018-04-23 DIAGNOSIS — K21.9 GASTROESOPHAGEAL REFLUX DISEASE WITHOUT ESOPHAGITIS: Primary | ICD-10-CM

## 2018-04-23 RX ORDER — RANITIDINE 150 MG/1
150 TABLET, FILM COATED ORAL 2 TIMES DAILY
Qty: 60 TAB | Refills: 5 | Status: SHIPPED | OUTPATIENT
Start: 2018-04-23 | End: 2018-09-16

## 2018-04-26 LAB
1,25(OH)2D3 SERPL-MCNC: 62.6 PG/ML (ref 19.9–79.3)
ALBUMIN SERPL-MCNC: 4.3 G/DL (ref 3.5–5.5)
ALBUMIN/CREAT UR: 27.1 MG/G CREAT (ref 0–30)
ALBUMIN/GLOB SERPL: 1.3 {RATIO} (ref 1.2–2.2)
ALP SERPL-CCNC: 49 IU/L (ref 39–117)
ALT SERPL-CCNC: 12 IU/L (ref 0–32)
AST SERPL-CCNC: 14 IU/L (ref 0–40)
BILIRUB SERPL-MCNC: 0.5 MG/DL (ref 0–1.2)
BUN SERPL-MCNC: 10 MG/DL (ref 6–24)
BUN/CREAT SERPL: 16 (ref 9–23)
CALCIUM SERPL-MCNC: 9.5 MG/DL (ref 8.7–10.2)
CHLORIDE SERPL-SCNC: 101 MMOL/L (ref 96–106)
CHOLEST SERPL-MCNC: 282 MG/DL (ref 100–199)
CO2 SERPL-SCNC: 27 MMOL/L (ref 18–29)
COMMENT, 011824: ABNORMAL
CREAT SERPL-MCNC: 0.61 MG/DL (ref 0.57–1)
CREAT UR-MCNC: 68 MG/DL
ERYTHROCYTE [SEDIMENTATION RATE] IN BLOOD BY WESTERGREN METHOD: 24 MM/HR (ref 0–40)
EST. AVERAGE GLUCOSE BLD GHB EST-MCNC: 111 MG/DL
FT4I SERPL CALC-MCNC: 2 (ref 1.2–4.9)
GFR SERPLBLD CREATININE-BSD FMLA CKD-EPI: 103 ML/MIN/1.73
GFR SERPLBLD CREATININE-BSD FMLA CKD-EPI: 119 ML/MIN/1.73
GLOBULIN SER CALC-MCNC: 3.2 G/DL (ref 1.5–4.5)
GLUCOSE SERPL-MCNC: 92 MG/DL (ref 65–99)
HBA1C MFR BLD: 5.5 % (ref 4.8–5.6)
HCV AB S/CO SERPL IA: <0.1 S/CO RATIO (ref 0–0.9)
HDLC SERPL-MCNC: 57 MG/DL
INTERPRETATION, 910389: NORMAL
LDLC SERPL CALC-MCNC: 209 MG/DL (ref 0–99)
METHYLMALONATE SERPL-SCNC: 77 NMOL/L (ref 0–378)
MICROALBUMIN UR-MCNC: 18.4 UG/ML
POTASSIUM SERPL-SCNC: 3.8 MMOL/L (ref 3.5–5.2)
PROT SERPL-MCNC: 7.5 G/DL (ref 6–8.5)
SODIUM SERPL-SCNC: 140 MMOL/L (ref 134–144)
T3RU NFR SERPL: 35 % (ref 24–39)
T4 SERPL-MCNC: 5.8 UG/DL (ref 4.5–12)
TRIGL SERPL-MCNC: 82 MG/DL (ref 0–149)
TSH SERPL DL<=0.005 MIU/L-ACNC: 0.96 UIU/ML (ref 0.45–4.5)
VLDLC SERPL CALC-MCNC: 16 MG/DL (ref 5–40)

## 2018-05-04 DIAGNOSIS — Z12.39 SCREENING BREAST EXAMINATION: Primary | ICD-10-CM

## 2018-08-05 ENCOUNTER — TELEPHONE (OUTPATIENT)
Dept: FAMILY MEDICINE CLINIC | Age: 53
End: 2018-08-05

## 2019-01-10 DIAGNOSIS — I10 ESSENTIAL HYPERTENSION: ICD-10-CM

## 2019-01-10 RX ORDER — LISINOPRIL AND HYDROCHLOROTHIAZIDE 20; 25 MG/1; MG/1
TABLET ORAL
Qty: 60 TAB | Refills: 5 | OUTPATIENT
Start: 2019-01-10

## 2019-01-15 NOTE — TELEPHONE ENCOUNTER
Pt needs to be seen in the office before receiving any refills. She no-showed to her appt yesterday. Is she able to reschedule?

## 2019-01-18 PROBLEM — E66.01 SEVERE OBESITY (BMI 35.0-39.9) WITH COMORBIDITY (HCC): Status: RESOLVED | Noted: 2018-04-20 | Resolved: 2019-01-18

## 2019-01-18 PROBLEM — E03.9 ACQUIRED HYPOTHYROIDISM: Status: RESOLVED | Noted: 2018-04-20 | Resolved: 2019-01-18

## 2019-09-25 PROBLEM — Z12.39 BREAST CANCER SCREENING: Status: RESOLVED | Noted: 2017-02-09 | Resolved: 2019-09-25

## 2021-03-29 ENCOUNTER — APPOINTMENT (OUTPATIENT)
Dept: GENERAL RADIOLOGY | Age: 56
End: 2021-03-29
Attending: EMERGENCY MEDICINE

## 2021-03-29 ENCOUNTER — HOSPITAL ENCOUNTER (EMERGENCY)
Age: 56
Discharge: HOME | End: 2021-03-29
Attending: EMERGENCY MEDICINE

## 2021-03-29 VITALS
DIASTOLIC BLOOD PRESSURE: 79 MMHG | OXYGEN SATURATION: 98 % | WEIGHT: 181 LBS | SYSTOLIC BLOOD PRESSURE: 116 MMHG | HEIGHT: 61 IN | TEMPERATURE: 98.2 F | BODY MASS INDEX: 34.17 KG/M2 | HEART RATE: 81 BPM | RESPIRATION RATE: 18 BRPM
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
## 2021-03-29 DIAGNOSIS — S30.0XXA CONTUSION OF LOWER BACK AND PELVIS, INITIAL ENCOUNTER: ICD-10-CM

## 2021-03-29 PROCEDURE — 99282 EMERGENCY DEPT VISIT SF MDM: CPT

## 2021-03-29 PROCEDURE — 73502 X-RAY EXAM HIP UNI 2-3 VIEWS: CPT

## 2021-03-29 PROCEDURE — 72100 X-RAY EXAM L-S SPINE 2/3 VWS: CPT

## 2021-03-29 RX ORDER — NAPROXEN 500 MG/1
500 TABLET ORAL
Qty: 20 TAB | Refills: 0 | Status: SHIPPED | OUTPATIENT
Start: 2021-03-29 | End: 2021-04-07

## 2021-03-29 RX ORDER — CYCLOBENZAPRINE HCL 10 MG
10 TABLET ORAL
Qty: 30 TAB | Refills: 0 | Status: SHIPPED | OUTPATIENT
Start: 2021-03-29 | End: 2021-04-07

## 2021-03-29 RX ORDER — METHYLPREDNISOLONE 4 MG/1
TABLET ORAL
Qty: 1 DOSE PACK | Refills: 0 | Status: SHIPPED | OUTPATIENT
Start: 2021-03-29 | End: 2021-04-07

## 2021-03-29 NOTE — ED NOTES
Discharge instructions are reviewed with patient who denies questions or concerns. Leaves amb to care of self.

## 2021-03-29 NOTE — LETTER
[facility] EMERGENCY DEP 
[address] [id number] 
[phone] Work/School Note Date: 3/29/2021 To Whom It May concern: 
 
Marianne Bob was seen and treated today in the emergency room by the following provider(s): 
Attending Provider: Kevin Velázquez MD.   
 
Kortney Ramsay may return to work on 4/5/21. Sincerely, Fausto Gama MD

## 2021-03-29 NOTE — ED PROVIDER NOTES
EMERGENCY DEPARTMENT HISTORY AND PHYSICAL EXAM      Date: 3/29/2021  Patient Name: Rob Pennington    History of Presenting Illness     Chief Complaint   Patient presents with    Back Pain       History Provided By: Patient    HPI: Rob Pennington, 61 y.o. female with PMHx significant for hypertension GERD, presents ambulatory to the ED with cc of left low back and left hip pain. Patient reports several months ago she slipped on ice coming out of her house and fell onto her left low back and left hip and has had some intermittent pain since. She did not seek medical attention at the time of this fall. Been treating her pain with Tylenol and otherwise doing okay. Last evening around 10 PM, she fell inside her home onto hardwood floor landing on her left low back and hip and aggravated the injury. She has been ambulatory although it causes pain. No open wounds. No other injuries. Pain is a constant dull ache and worse with weightbearing. PMHx: Significant for hypertension, neurofibromatosis, GERD  PSHx: Significant for TAY and BSO, bilateral shoulder arthroscopy, carpal tunnel release  Social Hx: Non-smoker. Nondrinker. There are no other complaints, changes, or physical findings at this time. PCP: Sam Ceron., MD    No current facility-administered medications on file prior to encounter.       Current Outpatient Medications on File Prior to Encounter   Medication Sig Dispense Refill    lisinopril-hydroCHLOROthiazide (PRINZIDE, ZESTORETIC) 20-25 mg per tablet Take 1 tablet by mouth twice daily 60 Tab 0       Past History     Past Medical History:  Past Medical History:   Diagnosis Date    Arthritis     Essential hypertension     GERD (gastroesophageal reflux disease)     Hyperlipidemia     Neurofibromatosis ([facility]     Pancreatitis        Past Surgical History:  Past Surgical History:   Procedure Laterality Date    HX CARPAL TUNNEL RELEASE      HX SHOULDER ARTHROSCOPY Bilateral  HX TAY AND BSO         Family History:  Family History   Problem Relation Age of Onset    Other Mother         neurofibromatosis and MR    Heart Disease Father     Other Brother         MR    Thyroid Cancer Child        Social History:  Social History     Tobacco Use    Smoking status: Never Smoker    Smokeless tobacco: Never Used   Substance Use Topics    Alcohol use: No    Drug use: No       Allergies: Allergies   Allergen Reactions    Sulfa (Sulfonamide Antibiotics) Hives    Toviaz [Fesoterodine] Other (comments)     Chest pain         Review of Systems   Review of Systems   Constitutional: Negative for activity change, chills and fever. HENT: Negative for congestion and sore throat. Eyes: Negative for pain and redness. Respiratory: Negative for cough, chest tightness and shortness of breath. Cardiovascular: Negative for chest pain and palpitations. Gastrointestinal: Negative for abdominal pain, diarrhea, nausea and vomiting. Genitourinary: Negative for dysuria, frequency and urgency. Musculoskeletal: Positive for back pain. Negative for neck pain. Skin: Negative for rash. Neurological: Negative for syncope, light-headedness and headaches. Psychiatric/Behavioral: Negative for confusion. All other systems reviewed and are negative. Physical Exam   Physical Exam  Vitals signs and nursing note reviewed. Constitutional:       General: She is not in acute distress. Appearance: She is well-developed. She is not diaphoretic. HENT:      Head: Normocephalic. Nose: Nose normal.      Mouth/Throat:      Pharynx: No oropharyngeal exudate. Eyes:      General: No scleral icterus. Conjunctiva/sclera: Conjunctivae normal.      Pupils: Pupils are equal, round, and reactive to light. Neck:      Musculoskeletal: Normal range of motion and neck supple. Thyroid: No thyromegaly. Vascular: No JVD. Trachea: No tracheal deviation.    Cardiovascular:      Rate and Rhythm: Normal rate and regular rhythm. Heart sounds: No murmur. No friction rub. No gallop. Pulmonary:      Effort: Pulmonary effort is normal. No respiratory distress. Breath sounds: Normal breath sounds. No stridor. No wheezing or rales. Abdominal:      General: Bowel sounds are normal. There is no distension. Palpations: Abdomen is soft. Tenderness: There is no abdominal tenderness. There is no guarding or rebound. Musculoskeletal: Normal range of motion. Comments: Mild tenderness of midline L-spine and left lumbar paraspinal musculature. Step-offs. She is also tender of her posterior hip and buttock. Full range of motion at the hip without difficulty. Toes up/down. 2+ DP and PT pulses. Motor and sensory grossly intact. Pelvis stable to AP and lateral compression. Lymphadenopathy:      Cervical: No cervical adenopathy. Skin:     General: Skin is warm and dry. Findings: No erythema or rash. Neurological:      Mental Status: She is alert and oriented to person, place, and time. Cranial Nerves: No cranial nerve deficit. Motor: No abnormal muscle tone. Coordination: Coordination normal.   Psychiatric:         Behavior: Behavior normal.             Diagnostic Study Results     Labs -   No results found for this or any previous visit (from the past 12 hour(s)). Radiologic Studies -   XR SPINE LUMB 2 OR 3 V   Final Result   No acute fracture or subluxation. XR HIP LT W OR WO PELV 2-3 VWS   Final Result   No evidence of acute fracture or dislocation. CT Results  (Last 48 hours)    None        CXR Results  (Last 48 hours)    None            Medical Decision Making   I am the first provider for this patient. I reviewed the vital signs, available nursing notes, past medical history, past surgical history, family history and social history. Vital Signs-Reviewed the patient's vital signs.   Patient Vitals for the past 12 hrs: Temp Pulse Resp BP SpO2   03/29/21 0935 98.2 °F (36.8 °C) 81 18 116/79 98 %           Records Reviewed: Nursing notes reviewed    Provider Notes (Medical Decision Making):   DDX: Contusion, lumbar strain, fracture. ED Course:   Initial assessment performed. The patients presenting problems have been discussed, and they are in agreement with the care plan formulated and outlined with them. I have encouraged them to ask questions as they arise throughout their visit. PROGRESS NOTE    Pt reevaluated. Plain films without any acute findings. No neuro deficits. Will discharge with Medrol Dosepak, naproxen and Flexeril. Written by Rex Bowens MD     Progress note:    Pt noted to be feeling better and ready for discharge. Updated pt and/or family on all final imaging findings. Will follow up as instructed. All questions have been answered, pt voiced understanding and agreement with plan. Specific return precautions provided as well as instructions to return to the ED should sx worsen at any time. Vital signs stable for discharge. I have also put together some discharge instructions for them that include: 1) educational information regarding their diagnosis, 2) how to care for their diagnosis at home, as well a 3) list of reasons why they would want to return to the ED prior to their follow-up appointment, should their condition change. Written by Rex Bowens MD        Critical Care Time:   0    Disposition:  Discharge    PLAN:  1. Current Discharge Medication List      START taking these medications    Details   methylPREDNISolone (Medrol, Stuart,) 4 mg tablet Take as directed  Qty: 1 Dose Pack, Refills: 0      naproxen (NAPROSYN) 500 mg tablet Take 1 Tab by mouth every twelve (12) hours as needed for Pain. Qty: 20 Tab, Refills: 0      cyclobenzaprine (FLEXERIL) 10 mg tablet Take 1 Tab by mouth three (3) times daily as needed for Muscle Spasm(s). Qty: 30 Tab, Refills: 0           2. Follow-up Information     Follow up With Specialties Details Why Contact Info    Lit Doshi MD Family Medicine Schedule an appointment as soon as possible for a visit in 1 week  [facility] [address] [place] [address] [facility].      [address] [place] [address] Emergency Medicine Go in 1 day If symptoms worsen [address] [id number] [id number]        Return to ED if worse     Diagnosis     Clinical Impression:   1. Fall, initial encounter    2. Contusion of lower back and pelvis, initial encounter              Please note that this dictation was completed with [employer], the [employer] voice recognition software. Quite often unanticipated grammatical, syntax, homophones, and other interpretive errors are inadvertently transcribed by the computer software. Please disregard these errors. Please excuse any errors that have escaped final proofreading.

## 2021-04-07 ENCOUNTER — OFFICE VISIT (OUTPATIENT)
Dept: FAMILY MEDICINE CLINIC | Age: 56
End: 2021-04-07

## 2021-04-07 VITALS
BODY MASS INDEX: 34.2 KG/M2 | OXYGEN SATURATION: 100 % | HEIGHT: 61 IN | RESPIRATION RATE: 18 BRPM | DIASTOLIC BLOOD PRESSURE: 73 MMHG | WEIGHT: 181.13 LBS | SYSTOLIC BLOOD PRESSURE: 109 MMHG | HEART RATE: 91 BPM | TEMPERATURE: 98.6 F

## 2021-04-07 DIAGNOSIS — Q85.00 NEUROFIBROMATOSIS (HCC): Primary | ICD-10-CM

## 2021-04-07 DIAGNOSIS — I10 ESSENTIAL HYPERTENSION: ICD-10-CM

## 2021-04-07 DIAGNOSIS — R41.3 MEMORY LOSS: ICD-10-CM

## 2021-04-07 DIAGNOSIS — S39.012A LUMBAR STRAIN, INITIAL ENCOUNTER: ICD-10-CM

## 2021-04-07 PROCEDURE — 99214 OFFICE O/P EST MOD 30 MIN: CPT | Performed by: FAMILY MEDICINE

## 2021-04-07 RX ORDER — GABAPENTIN 600 MG/1
600 TABLET ORAL
Qty: 30 TAB | Refills: 2 | Status: SHIPPED | OUTPATIENT
Start: 2021-04-07 | End: 2021-12-03

## 2021-04-07 RX ORDER — PREDNISONE 20 MG/1
TABLET ORAL
Qty: 30 TAB | Refills: 0 | Status: SHIPPED | OUTPATIENT
Start: 2021-04-07 | End: 2021-05-04 | Stop reason: ALTCHOICE

## 2021-04-07 RX ORDER — CHLORZOXAZONE 500 MG/1
500 TABLET ORAL
Qty: 60 TAB | Refills: 2 | Status: SHIPPED | OUTPATIENT
Start: 2021-04-07 | End: 2021-12-03

## 2021-04-07 NOTE — LETTER
NOTIFICATION RETURN TO WORK / SCHOOL 
 
4/7/2021 4:04 PM 
 
Ms. Marianne Patiño [address] [place] [phone] To Whom It May Concern: 
 
Suha Sampson is currently under the care of [address] [place], [place] [address]. She will return to work/school on: 4/21/2021 If there are questions or concerns please have the patient contact our office. Sincerely, Shelly Jones MD

## 2021-04-07 NOTE — PROGRESS NOTES
1. Have you been to the ER, urgent care clinic since your last visit? Hospitalized since your last visit? [facility] for fall and hip and back pain    2. Have you seen or consulted any other health care providers outside of the [address] [place] [address] since your last visit? Include any pap smears or colon screening.  No   Chief Complaint   Patient presents with    Fall     er follow up    Back Pain    Hip Pain    Hand Pain     Burning at night BL    Knee Pain     left     Visit Vitals  /73 (BP 1 Location: Left arm, BP Patient Position: Sitting)   Pulse 91   Temp 98.6 °F (37 °C) (Temporal)   Resp 18   Ht 5' 1\" (1.549 m)   Wt 181 lb 2 oz (82.2 kg)   SpO2 100%   BMI 34.22 kg/m²

## 2021-04-07 NOTE — PROGRESS NOTES
Karlene Gonzalez is a 61 y.o. female who presents with the following:  Chief Complaint   Patient presents with   [facility] Fall     er follow up    Back Pain    Hip Pain    Hand Pain     Burning at night BL    Knee Pain     left       Patient who has a history of essential hypertension and neurofibromatosis and bilateral carpal tunnel syndrome and cervical radiculopathy with degenerative disc disease and degenerative joint disease and who has mixed hyperlipidemia comes in today after suffering a fall slipping on ice back during the last snowstorm and she tried to tough it out and get well without coming to see a physician. 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 She finally went to the emergency room and was seen and x-rayed and found to have no fractures and there was referred in to our clinic. The patient also has been having difficulty with her memory which is a new problem. And since the lady has neurofibromatosis it is concerning that she may have a neurofibroma growing in her brain that is causing her to have this problem. Allergies   Allergen Reactions    Sulfa (Sulfonamide Antibiotics) Hives    Toviaz [Fesoterodine] Other (comments)     Chest pain       Current Outpatient Medications   Medication Sig    predniSONE (DELTASONE) 20 mg tablet 5 tablets day for days 1 through 4, then 4 tablets on day 5, then 3 tablets on day 6, then 2 tablets on day 7, then 1 tablet on day 8.  chlorzoxazone (PARAFON FORTE) 500 mg tablet Take 1 Tab by mouth four (4) times daily as needed for Muscle Spasm(s).  gabapentin (NEURONTIN) 600 mg tablet Take 1 Tab by mouth nightly as needed for Pain.  Max Daily Amount: 600 mg.    lisinopril-hydroCHLOROthiazide (PRINZIDE, ZESTORETIC) 20-25 mg per tablet Take 1 tablet by mouth twice daily     No current facility-administered medications for this visit. Past Medical History:   Diagnosis Date    Arthritis     Essential hypertension     GERD (gastroesophageal reflux disease)     Hyperlipidemia     Neurofibromatosis ([address]     Pancreatitis        Past Surgical History:   Procedure Laterality Date    HX CARPAL TUNNEL RELEASE      HX SHOULDER ARTHROSCOPY Bilateral     HX TAY AND BSO         Family History   Problem Relation Age of Onset    Other Mother         neurofibromatosis and MR    Heart Disease Father     Other Brother         MR    Thyroid Cancer Child        Social History     Socioeconomic History    Marital status:      Spouse name: Not on file    Number of children: Not on file    Years of education: Not on file    Highest education level: Not on file   Tobacco Use    Smoking status: Never Smoker    Smokeless tobacco: Never Used   Substance and Sexual Activity    Alcohol use: No    Drug use: No       Review of Systems   Constitutional: Negative for chills, fever, malaise/fatigue and weight loss. HENT: Negative for congestion, hearing loss, sore throat and tinnitus. Eyes: Negative for blurred vision, pain and discharge. Respiratory: Negative for cough, shortness of breath and wheezing. Cardiovascular: Negative for chest pain, palpitations, orthopnea, claudication and leg swelling. Gastrointestinal: Negative for abdominal pain, constipation and heartburn. Genitourinary: Negative for dysuria, frequency and urgency. Musculoskeletal: Positive for back pain, myalgias and neck pain. Negative for falls and joint pain. Skin: Negative for itching and rash. Neurological: Positive for tingling. Negative for dizziness, tremors and headaches. Endo/Heme/Allergies: Negative for environmental allergies and polydipsia. Psychiatric/Behavioral: Positive for memory loss. Negative for depression and substance abuse.  The patient is not nervous/anxious. Visit Vitals  /73 (BP 1 Location: Left arm, BP Patient Position: Sitting)   Pulse 91   Temp 98.6 °F (37 °C) (Temporal)   Resp 18   Ht 5' 1\" (1.549 m)   Wt 181 lb 2 oz (82.2 kg)   SpO2 100%   BMI 34.22 kg/m²     Physical Exam  Vitals signs reviewed. Constitutional:       General: She is not in acute distress. Appearance: Normal appearance. She is obese. She is not ill-appearing. HENT:      Head: Normocephalic and atraumatic. Right Ear: Tympanic membrane, ear canal and external ear normal.      Left Ear: Tympanic membrane, ear canal and external ear normal.      Nose: Nose normal. No congestion or rhinorrhea. Mouth/Throat:      Mouth: Mucous membranes are moist.      Pharynx: No posterior oropharyngeal erythema. Eyes:      General:         Right eye: No discharge. Left eye: No discharge. Extraocular Movements: Extraocular movements intact. Conjunctiva/sclera: Conjunctivae normal.      Pupils: Pupils are equal, round, and reactive to light. Comments: Cornea anterior chamber and iris are normal.   Neck:      Musculoskeletal: Normal range of motion and neck supple. Trachea: No tracheal deviation. Cardiovascular:      Rate and Rhythm: Normal rate and regular rhythm. Pulses: Normal pulses. Heart sounds: Normal heart sounds. No murmur. No friction rub. No gallop. Pulmonary:      Effort: Pulmonary effort is normal. No respiratory distress. Breath sounds: Normal breath sounds. No wheezing or rhonchi. Chest:      Chest wall: No tenderness. Abdominal:      General: Bowel sounds are normal. There is no distension. Palpations: Abdomen is soft. There is no mass. Tenderness: There is no abdominal tenderness. There is no guarding or rebound. Musculoskeletal:         General: Tenderness present. No deformity. Right lower leg: No edema. Left lower leg: No edema.       Comments: Patient is tender in the paraspinous muscles especially in the lumbar region but also somewhat in the neck. The patient also has tenderness into the sacroiliac joints and into the gluteal muscles bilaterally. Lymphadenopathy:      Cervical: No cervical adenopathy. Skin:     General: Skin is warm and dry. Coloration: Skin is not pale. Findings: No erythema or rash. Neurological:      General: No focal deficit present. Mental Status: She is alert and oriented to person, place, and time. Cranial Nerves: No cranial nerve deficit. Motor: No abnormal muscle tone. Deep Tendon Reflexes: Reflexes are normal and symmetric. Reflexes normal.      Comments: Cranial nerves II through XII are intact sensory and motor. Biceps triceps knee and ankle DTRs are normal and symmetrical.   Psychiatric:         Mood and Affect: Mood normal.         Behavior: Behavior normal.           ICD-10-CM ICD-9-CM    1. Neurofibromatosis ([facility] [address]  Q85.00 237.70 MRI BRAIN W WO CONT      COLLECTION VENOUS BLOOD,VENIPUNCTURE   2. Memory loss  R41.3 780.93 MRI BRAIN W WO CONT      COLLECTION VENOUS BLOOD,VENIPUNCTURE   3. Lumbar strain, initial encounter  S39.012A 847.2 REFERRAL TO PHYSICAL THERAPY      predniSONE (DELTASONE) 20 mg tablet      chlorzoxazone (PARAFON FORTE) 500 mg tablet      gabapentin (NEURONTIN) 600 mg tablet   4. Essential hypertension  U69 332.3 METABOLIC PANEL, BASIC      METABOLIC PANEL, BASIC       Orders Placed This Encounter    COLLECTION VENOUS BLOOD,VENIPUNCTURE    MRI BRAIN W WO CONT     Standing Status:   Future     Standing Expiration Date:   6/7/2021     Order Specific Question:   STAT Creatinine as indicated     Answer:    Yes    METABOLIC PANEL, BASIC     Standing Status:   Future     Number of Occurrences:   1     Standing Expiration Date:   4/7/2022    REFERRAL TO PHYSICAL THERAPY     Referral Priority:   Routine     Referral Type:   PT/OT/ST     Referral Reason:   Specialty Services Required Number of Visits Requested:   1    predniSONE (DELTASONE) 20 mg tablet     Si tablets day for days 1 through 4, then 4 tablets on day 5, then 3 tablets on day 6, then 2 tablets on day 7, then 1 tablet on day 8. Dispense:  30 Tab     Refill:  0    chlorzoxazone (PARAFON FORTE) 500 mg tablet     Sig: Take 1 Tab by mouth four (4) times daily as needed for Muscle Spasm(s). Dispense:  60 Tab     Refill:  2    gabapentin (NEURONTIN) 600 mg tablet     Sig: Take 1 Tab by mouth nightly as needed for Pain. Max Daily Amount: 600 mg.      Dispense:  30 Tab     Refill:  2           Genia Campos MD

## 2021-04-07 NOTE — LETTER
NOTIFICATION RETURN TO WORK / SCHOOL 
 
4/7/2021 4:25 PM 
 
Ms. Marianne [place] [id number] To Whom It May Concern: 
 
Morgan Baker is currently under the care of [address] [place], [place] [address]. She will return to work/school on: 4/22/2021 If there are questions or concerns please have the patient contact our office. Sincerely, Beata Freeman MD

## 2021-04-08 LAB
ANION GAP SERPL CALC-SCNC: 2 MMOL/L (ref 5–15)
BUN SERPL-MCNC: 17 MG/DL (ref 6–20)
BUN/CREAT SERPL: 25 (ref 12–20)
CALCIUM SERPL-MCNC: 9.6 MG/DL (ref 8.5–10.1)
CHLORIDE SERPL-SCNC: 104 MMOL/L (ref 97–108)
CO2 SERPL-SCNC: 30 MMOL/L (ref 21–32)
CREAT SERPL-MCNC: 0.68 MG/DL (ref 0.55–1.02)
GLUCOSE SERPL-MCNC: 101 MG/DL (ref 65–100)
POTASSIUM SERPL-SCNC: 4.3 MMOL/L (ref 3.5–5.1)
SODIUM SERPL-SCNC: 136 MMOL/L (ref 136–145)

## 2021-04-12 ENCOUNTER — HOSPITAL ENCOUNTER (OUTPATIENT)
Dept: MRI IMAGING | Age: 56
Discharge: HOME | End: 2021-04-12
Attending: FAMILY MEDICINE

## 2021-04-12 DIAGNOSIS — R41.3 MEMORY LOSS: ICD-10-CM

## 2021-04-12 DIAGNOSIS — Q85.00 NEUROFIBROMATOSIS (HCC): ICD-10-CM

## 2021-04-12 PROCEDURE — A9575 INJ GADOTERATE MEGLUMI 0.1ML: HCPCS | Performed by: FAMILY MEDICINE

## 2021-04-12 PROCEDURE — 74011250636 HC RX REV CODE- 250/636: Performed by: FAMILY MEDICINE

## 2021-04-12 PROCEDURE — 70553 MRI BRAIN STEM W/O & W/DYE: CPT

## 2021-04-12 RX ORDER — GADOTERATE MEGLUMINE 376.9 MG/ML
20 INJECTION INTRAVENOUS
Status: COMPLETED | OUTPATIENT
Start: 2021-04-12 | End: 2021-04-12

## 2021-04-12 RX ADMIN — GADOTERATE MEGLUMINE 17 ML: 376.9 INJECTION INTRAVENOUS at 08:49

## 2021-04-14 DIAGNOSIS — E78.00 HYPERCHOLESTEROLEMIA: Primary | ICD-10-CM

## 2021-04-14 RX ORDER — ATORVASTATIN CALCIUM 40 MG/1
40 TABLET, FILM COATED ORAL DAILY
Qty: 90 TAB | Refills: 1 | Status: SHIPPED | OUTPATIENT
Start: 2021-04-14 | End: 2021-12-03

## 2021-04-14 NOTE — PROGRESS NOTES
Patient identified by two patient identifiers, name and date of birth. Spoke with patient's daughter on disclosure. Her and the patient are aware of results and states understanding at this time. They would like to try the statin an would like it sent to [address] in [address] [place] [address]

## 2021-05-04 ENCOUNTER — OFFICE VISIT (OUTPATIENT)
Dept: FAMILY MEDICINE CLINIC | Age: 56
End: 2021-05-04

## 2021-05-04 VITALS
HEART RATE: 109 BPM | HEIGHT: 61 IN | TEMPERATURE: 98.8 F | DIASTOLIC BLOOD PRESSURE: 69 MMHG | RESPIRATION RATE: 18 BRPM | SYSTOLIC BLOOD PRESSURE: 99 MMHG | OXYGEN SATURATION: 99 % | WEIGHT: 180.13 LBS | BODY MASS INDEX: 34.01 KG/M2

## 2021-05-04 DIAGNOSIS — G47.33 OSA (OBSTRUCTIVE SLEEP APNEA): Primary | ICD-10-CM

## 2021-05-04 DIAGNOSIS — F09 COGNITIVE DYSFUNCTION: ICD-10-CM

## 2021-05-04 PROCEDURE — 99213 OFFICE O/P EST LOW 20 MIN: CPT | Performed by: FAMILY MEDICINE

## 2021-05-04 NOTE — PROGRESS NOTES
1. Have you been to the ER, urgent care clinic since your last visit? Hospitalized since your last visit? No    2. Have you seen or consulted any other health care providers outside of the [address] [place] [address] since your last visit? Include any pap smears or colon screening. No  Chief Complaint   Patient presents with    Fall     2 week f/u     Visit Vitals  BP 99/69 (BP 1 Location: Left arm, BP Patient Position: Sitting)   Pulse (!) 109   Temp 98.8 °F (37.1 °C) (Temporal)   Resp 18   Ht 5' 1\" (1.549 m)   Wt 180 lb 2 oz (81.7 kg)   SpO2 99%   BMI 34.03 kg/m²

## 2021-05-04 NOTE — PROGRESS NOTES
Bruna Salas is a 61 y.o. female who presents with the following:  Chief Complaint   Patient presents with   Du[facility] Hark Fall     2 week f/u       Patient's daughter states that her mother's been having difficulties with remembering for quite some time but it is been getting worse. On the patient's MRI of her head she had no neuro fibrosis neurofibromatosis but she was noted to have small vessel disease rather diffusely. This was noted is being mild to moderate. I pointed out to the patient and her daughter that she was on the correct medicines at this point in time for the problem at hand but it still would be slowly progressive. The daughter at this point would like to know whether her mother qualified for disability and told her that that was a function of the state as she would have to apply for disability but the more things that were pointed out that would interfere with her working better chance she would have is getting disability. The daughter stated that her mother was frequently going to sleep in the middle of conversations and the daughter herself has sleep apnea as does her father. Allergies   Allergen Reactions    Sulfa (Sulfonamide Antibiotics) Hives    Toviaz [Fesoterodine] Other (comments)     Chest pain       Current Outpatient Medications   Medication Sig    lisinopril-hydroCHLOROthiazide (PRINZIDE, ZESTORETIC) 20-25 mg per tablet Take 1 tablet by mouth twice daily    atorvastatin (LIPITOR) 40 mg tablet Take 1 Tab by mouth daily.  chlorzoxazone (PARAFON FORTE) 500 mg tablet Take 1 Tab by mouth four (4) times daily as needed for Muscle Spasm(s).  gabapentin (NEURONTIN) 600 mg tablet Take 1 Tab by mouth nightly as needed for Pain. Max Daily Amount: 600 mg. No current facility-administered medications for this visit.         Past Medical History:   Diagnosis Date    Arthritis     Essential hypertension     GERD (gastroesophageal reflux disease)     Hyperlipidemia     Neurofibromatosis ([facility] [address]     Pancreatitis        Past Surgical History:   Procedure Laterality Date    HX CARPAL TUNNEL RELEASE      HX SHOULDER ARTHROSCOPY Bilateral     HX TAY AND BSO         Family History   Problem Relation Age of Onset    Other Mother         neurofibromatosis and MR    Heart Disease Father     Other Brother         MR    Thyroid Cancer Child        Social History     Socioeconomic History    Marital status:      Spouse name: Not on file    Number of children: Not on file    Years of education: Not on file    Highest education level: Not on file   Tobacco Use    Smoking status: Never Smoker    Smokeless tobacco: Never Used   Substance and Sexual Activity    Alcohol use: No    Drug use: No       Review of Systems   Constitutional: Negative for chills, fever, malaise/fatigue and weight loss. HENT: Negative for congestion, hearing loss, sore throat and tinnitus. Eyes: Negative for blurred vision, pain and discharge. Respiratory: Negative for cough, shortness of breath and wheezing. Cardiovascular: Negative for chest pain, palpitations, orthopnea, claudication and leg swelling. Gastrointestinal: Negative for abdominal pain, constipation and heartburn. Genitourinary: Negative for dysuria, frequency and urgency. Musculoskeletal: Positive for back pain and falls. Negative for joint pain and myalgias. The daughter stated that her mother was having frequent falls because her knees were giving way. I pointed out to the daughter that her mother was significantly obese and that her knees were not made to carry that much weight on top of the fact she is dealing with back pain. Skin: Negative for itching and rash. Neurological: Negative for dizziness, tingling, tremors and headaches. Endo/Heme/Allergies: Negative for environmental allergies and polydipsia. Psychiatric/Behavioral: Negative for depression and substance abuse.  The patient is not nervous/anxious. Visit Vitals  BP 99/69 (BP 1 Location: Left arm, BP Patient Position: Sitting)   Pulse (!) 109   Temp 98.8 °F (37.1 °C) (Temporal)   Resp 18   Ht 5' 1\" (1.549 m)   Wt 180 lb 2 oz (81.7 kg)   SpO2 99%   BMI 34.03 kg/m²     Physical Exam  Vitals signs reviewed. Constitutional:       General: She is not in acute distress. Appearance: Normal appearance. She is obese. She is not ill-appearing. HENT:      Head: Normocephalic and atraumatic. Right Ear: Tympanic membrane, ear canal and external ear normal.      Left Ear: Tympanic membrane, ear canal and external ear normal.      Nose: Nose normal. No congestion or rhinorrhea. Mouth/Throat:      Mouth: Mucous membranes are moist.      Pharynx: No posterior oropharyngeal erythema. Eyes:      General:         Right eye: No discharge. Left eye: No discharge. Extraocular Movements: Extraocular movements intact. Conjunctiva/sclera: Conjunctivae normal.      Pupils: Pupils are equal, round, and reactive to light. Comments: Cornea anterior chamber and iris are normal.   Neck:      Musculoskeletal: Normal range of motion and neck supple. Trachea: No tracheal deviation. Cardiovascular:      Rate and Rhythm: Normal rate and regular rhythm. Pulses: Normal pulses. Heart sounds: Normal heart sounds. No murmur. No friction rub. No gallop. Pulmonary:      Effort: Pulmonary effort is normal. No respiratory distress. Breath sounds: Normal breath sounds. No wheezing or rhonchi. Chest:      Chest wall: No tenderness. Abdominal:      General: Bowel sounds are normal. There is no distension. Palpations: Abdomen is soft. There is no mass. Tenderness: There is no abdominal tenderness. There is no guarding or rebound. Musculoskeletal:         General: No tenderness or deformity. Right lower leg: No edema. Left lower leg: No edema.    Lymphadenopathy:      Cervical: No cervical adenopathy. Skin:     General: Skin is warm and dry. Coloration: Skin is not pale. Findings: No erythema or rash. Neurological:      General: No focal deficit present. Mental Status: She is alert. Cranial Nerves: No cranial nerve deficit. Motor: No abnormal muscle tone. Deep Tendon Reflexes: Reflexes are normal and symmetric. Reflexes normal.      Comments: Cranial nerves II through XII are intact sensory and motor. Biceps triceps knee and ankle DTRs are normal and symmetrical.   Psychiatric:         Mood and Affect: Mood normal.         Behavior: Behavior normal.           ICD-10-CM ICD-9-CM    1. RYANNE (obstructive sleep apnea)  G47.33 327.23 SLEEP STUDY, UNATTENDED, SIMUL RECORD HR/O2 SAT/RESP FLOW/RESP EFF   2. Cognitive dysfunction  F09 294.9 REFERRAL TO NEUROLOGY       Orders Placed This Encounter    SLEEP STUDY, UNATTENDED, SIMUL RECORD HR/O2 SAT/RESP FLOW/RESP EFF   Dari Gordon Neurology ref [facility]     Referral Priority:   Routine     Referral Type:   Consultation     Referral Reason:   Specialty Services Required     Referred to Provider:   Cynthia Aguilar MD     Number of Visits Requested:   1       Follow-up and Dispositions    · Return in about 6 weeks (around 6/15/2021).          Crow Mcdonald MD

## 2021-05-05 ENCOUNTER — DOCUMENTATION ONLY (OUTPATIENT)
Dept: FAMILY MEDICINE CLINIC | Age: 56
End: 2021-05-05

## 2021-05-06 ENCOUNTER — DOCUMENTATION ONLY (OUTPATIENT)
Dept: FAMILY MEDICINE CLINIC | Age: 56
End: 2021-05-06

## 2021-05-06 NOTE — PROGRESS NOTES
Manually faxed referral, ov note, labs, mri and demo to Dr. Vlad Manley.   Also faxed form to [facility]

## 2021-05-10 ENCOUNTER — HOSPITAL ENCOUNTER (OUTPATIENT)
Dept: LAB | Age: 56
Discharge: HOME | End: 2021-05-10

## 2021-05-10 LAB
ALBUMIN SERPL-MCNC: 3.9 G/DL (ref 3.5–5)
ALBUMIN/GLOB SERPL: 1 {RATIO} (ref 1.1–2.2)
ALP SERPL-CCNC: 78 U/L (ref 45–117)
ALT SERPL-CCNC: 25 U/L (ref 12–78)
ANION GAP SERPL CALC-SCNC: 12 MMOL/L (ref 5–15)
AST SERPL-CCNC: 12 U/L (ref 15–37)
BASOPHILS # BLD: 0 K/UL (ref 0–0.1)
BASOPHILS NFR BLD: 0 % (ref 0–1)
BILIRUB SERPL-MCNC: 0.4 MG/DL (ref 0.2–1)
BUN SERPL-MCNC: 18 MG/DL (ref 6–20)
BUN/CREAT SERPL: 22 (ref 12–20)
CALCIUM SERPL-MCNC: 9.6 MG/DL (ref 8.5–10.1)
CHLORIDE SERPL-SCNC: 101 MMOL/L (ref 97–108)
CHOLEST SERPL-MCNC: 258 MG/DL
CO2 SERPL-SCNC: 30 MMOL/L (ref 21–32)
CREAT SERPL-MCNC: 0.83 MG/DL (ref 0.55–1.02)
DIFFERENTIAL METHOD BLD: ABNORMAL
EOSINOPHIL # BLD: 0.1 K/UL (ref 0–0.4)
EOSINOPHIL NFR BLD: 1 % (ref 0–7)
ERYTHROCYTE [DISTWIDTH] IN BLOOD BY AUTOMATED COUNT: 13 % (ref 11.5–14.5)
ERYTHROCYTE [SEDIMENTATION RATE] IN BLOOD: 60 MM/HR (ref 0–30)
EST. AVERAGE GLUCOSE BLD GHB EST-MCNC: 120 MG/DL
GLOBULIN SER CALC-MCNC: 4 G/DL (ref 2–4)
GLUCOSE SERPL-MCNC: 90 MG/DL (ref 65–100)
HBA1C MFR BLD: 5.8 % (ref 4–5.6)
HCT VFR BLD AUTO: 38.8 % (ref 35–47)
HDLC SERPL-MCNC: 60 MG/DL
HDLC SERPL: 4.3 {RATIO} (ref 0–5)
HGB BLD-MCNC: 12.7 G/DL (ref 11.5–16)
IMM GRANULOCYTES # BLD AUTO: 0.1 K/UL (ref 0–0.04)
IMM GRANULOCYTES NFR BLD AUTO: 1 % (ref 0–0.5)
LDLC SERPL CALC-MCNC: 181.6 MG/DL (ref 0–100)
LIPID PROFILE,FLP: ABNORMAL
LYMPHOCYTES # BLD: 2.7 K/UL (ref 0.8–3.5)
LYMPHOCYTES NFR BLD: 36 % (ref 12–49)
MCH RBC QN AUTO: 29.6 PG (ref 26–34)
MCHC RBC AUTO-ENTMCNC: 32.7 G/DL (ref 30–36.5)
MCV RBC AUTO: 90.4 FL (ref 80–99)
MONOCYTES # BLD: 0.5 K/UL (ref 0–1)
MONOCYTES NFR BLD: 7 % (ref 5–13)
NEUTS SEG # BLD: 4.1 K/UL (ref 1.8–8)
NEUTS SEG NFR BLD: 55 % (ref 32–75)
NRBC # BLD: 0 K/UL (ref 0–0.01)
NRBC BLD-RTO: 0 PER 100 WBC
PLATELET # BLD AUTO: 326 K/UL (ref 150–400)
PMV BLD AUTO: 10.2 FL (ref 8.9–12.9)
POTASSIUM SERPL-SCNC: 3.4 MMOL/L (ref 3.5–5.1)
PROT SERPL-MCNC: 7.9 G/DL (ref 6.4–8.2)
RBC # BLD AUTO: 4.29 M/UL (ref 3.8–5.2)
SODIUM SERPL-SCNC: 143 MMOL/L (ref 136–145)
TRIGL SERPL-MCNC: 82 MG/DL (ref ?–150)
TSH SERPL DL<=0.05 MIU/L-ACNC: 0.75 UIU/ML (ref 0.36–3.74)
VLDLC SERPL CALC-MCNC: 16.4 MG/DL
WBC # BLD AUTO: 7.4 K/UL (ref 3.6–11)

## 2021-05-10 PROCEDURE — 83036 HEMOGLOBIN GLYCOSYLATED A1C: CPT

## 2021-05-10 PROCEDURE — 84439 ASSAY OF FREE THYROXINE: CPT

## 2021-05-10 PROCEDURE — 80061 LIPID PANEL: CPT

## 2021-05-10 PROCEDURE — 84443 ASSAY THYROID STIM HORMONE: CPT

## 2021-05-10 PROCEDURE — 86431 RHEUMATOID FACTOR QUANT: CPT

## 2021-05-10 PROCEDURE — 86140 C-REACTIVE PROTEIN: CPT

## 2021-05-10 PROCEDURE — 85652 RBC SED RATE AUTOMATED: CPT

## 2021-05-10 PROCEDURE — 80053 COMPREHEN METABOLIC PANEL: CPT

## 2021-05-10 PROCEDURE — 82306 VITAMIN D 25 HYDROXY: CPT

## 2021-05-10 PROCEDURE — 85025 COMPLETE CBC W/AUTO DIFF WBC: CPT

## 2021-05-11 LAB
25(OH)D3 SERPL-MCNC: 9.3 NG/ML (ref 30–100)
CRP SERPL-MCNC: 1.56 MG/DL (ref 0–0.6)
RHEUMATOID FACT SERPL-ACNC: <10 IU/ML
T4 FREE SERPL-MCNC: 0.8 NG/DL (ref 0.8–1.5)

## 2021-05-25 ENCOUNTER — HOSPITAL ENCOUNTER (OUTPATIENT)
Dept: LAB | Age: 56
Discharge: HOME | End: 2021-05-25

## 2021-05-25 LAB — VIT B12 SERPL-MCNC: 457 PG/ML (ref 193–986)

## 2021-05-25 PROCEDURE — 86038 ANTINUCLEAR ANTIBODIES: CPT

## 2021-05-25 PROCEDURE — 82607 VITAMIN B-12: CPT

## 2021-05-26 LAB — ANA SER QL: NEGATIVE

## 2021-06-03 ENCOUNTER — TELEPHONE (OUTPATIENT)
Dept: FAMILY MEDICINE CLINIC | Age: 56
End: 2021-06-03

## 2021-08-06 ENCOUNTER — HOSPITAL ENCOUNTER (OUTPATIENT)
Dept: LAB | Age: 56
Discharge: HOME | End: 2021-08-06

## 2021-08-06 LAB
ALBUMIN SERPL-MCNC: 4.2 G/DL (ref 3.5–5)
ALBUMIN/GLOB SERPL: 1.1 {RATIO} (ref 1.1–2.2)
ALP SERPL-CCNC: 65 U/L (ref 45–117)
ALT SERPL-CCNC: 29 U/L (ref 12–78)
ANION GAP SERPL CALC-SCNC: 11 MMOL/L (ref 5–15)
AST SERPL-CCNC: 15 U/L (ref 15–37)
BASOPHILS # BLD: 0 K/UL (ref 0–0.1)
BASOPHILS NFR BLD: 0 % (ref 0–1)
BILIRUB SERPL-MCNC: 0.7 MG/DL (ref 0.2–1)
BUN SERPL-MCNC: 21 MG/DL (ref 6–20)
BUN/CREAT SERPL: 30 (ref 12–20)
CALCIUM SERPL-MCNC: 9.5 MG/DL (ref 8.5–10.1)
CHLORIDE SERPL-SCNC: 103 MMOL/L (ref 97–108)
CHOLEST SERPL-MCNC: 147 MG/DL
CO2 SERPL-SCNC: 30 MMOL/L (ref 21–32)
CREAT SERPL-MCNC: 0.71 MG/DL (ref 0.55–1.02)
DIFFERENTIAL METHOD BLD: ABNORMAL
EOSINOPHIL # BLD: 0.1 K/UL (ref 0–0.4)
EOSINOPHIL NFR BLD: 1 % (ref 0–7)
ERYTHROCYTE [DISTWIDTH] IN BLOOD BY AUTOMATED COUNT: 13.2 % (ref 11.5–14.5)
EST. AVERAGE GLUCOSE BLD GHB EST-MCNC: 120 MG/DL
GLOBULIN SER CALC-MCNC: 3.9 G/DL (ref 2–4)
GLUCOSE SERPL-MCNC: 90 MG/DL (ref 65–100)
HBA1C MFR BLD: 5.8 % (ref 4–5.6)
HCT VFR BLD AUTO: 37.6 % (ref 35–47)
HDLC SERPL-MCNC: 60 MG/DL
HDLC SERPL: 2.5 {RATIO} (ref 0–5)
HGB BLD-MCNC: 12.1 G/DL (ref 11.5–16)
IMM GRANULOCYTES # BLD AUTO: 0 K/UL (ref 0–0.04)
IMM GRANULOCYTES NFR BLD AUTO: 1 % (ref 0–0.5)
LDLC SERPL CALC-MCNC: 79.8 MG/DL (ref 0–100)
LYMPHOCYTES # BLD: 2.8 K/UL (ref 0.8–3.5)
LYMPHOCYTES NFR BLD: 35 % (ref 12–49)
MCH RBC QN AUTO: 28.8 PG (ref 26–34)
MCHC RBC AUTO-ENTMCNC: 32.2 G/DL (ref 30–36.5)
MCV RBC AUTO: 89.5 FL (ref 80–99)
MONOCYTES # BLD: 0.6 K/UL (ref 0–1)
MONOCYTES NFR BLD: 8 % (ref 5–13)
NEUTS SEG # BLD: 4.5 K/UL (ref 1.8–8)
NEUTS SEG NFR BLD: 55 % (ref 32–75)
NRBC # BLD: 0 K/UL (ref 0–0.01)
NRBC BLD-RTO: 0 PER 100 WBC
PLATELET # BLD AUTO: 281 K/UL (ref 150–400)
PMV BLD AUTO: 10.8 FL (ref 8.9–12.9)
POTASSIUM SERPL-SCNC: 3 MMOL/L (ref 3.5–5.1)
PROT SERPL-MCNC: 8.1 G/DL (ref 6.4–8.2)
RBC # BLD AUTO: 4.2 M/UL (ref 3.8–5.2)
SODIUM SERPL-SCNC: 144 MMOL/L (ref 136–145)
TRIGL SERPL-MCNC: 36 MG/DL (ref ?–150)
VLDLC SERPL CALC-MCNC: 7.2 MG/DL
WBC # BLD AUTO: 8.1 K/UL (ref 3.6–11)

## 2021-08-06 PROCEDURE — 82607 VITAMIN B-12: CPT

## 2021-08-06 PROCEDURE — 80061 LIPID PANEL: CPT

## 2021-08-06 PROCEDURE — 83036 HEMOGLOBIN GLYCOSYLATED A1C: CPT

## 2021-08-06 PROCEDURE — 85025 COMPLETE CBC W/AUTO DIFF WBC: CPT

## 2021-08-06 PROCEDURE — 82306 VITAMIN D 25 HYDROXY: CPT

## 2021-08-06 PROCEDURE — 80053 COMPREHEN METABOLIC PANEL: CPT

## 2021-08-07 LAB
25(OH)D3 SERPL-MCNC: 92.2 NG/ML (ref 30–100)
VIT B12 SERPL-MCNC: 567 PG/ML (ref 193–986)

## 2021-09-17 ENCOUNTER — HOSPITAL ENCOUNTER (OUTPATIENT)
Dept: LAB | Age: 56
Discharge: HOME | End: 2021-09-17

## 2021-09-17 PROCEDURE — 80048 BASIC METABOLIC PNL TOTAL CA: CPT

## 2021-09-18 LAB
ANION GAP SERPL CALC-SCNC: 10 MMOL/L (ref 5–15)
BUN SERPL-MCNC: 22 MG/DL (ref 6–20)
BUN/CREAT SERPL: 29 (ref 12–20)
CALCIUM SERPL-MCNC: 9.4 MG/DL (ref 8.5–10.1)
CHLORIDE SERPL-SCNC: 103 MMOL/L (ref 97–108)
CO2 SERPL-SCNC: 30 MMOL/L (ref 21–32)
CREAT SERPL-MCNC: 0.77 MG/DL (ref 0.55–1.02)
GLUCOSE SERPL-MCNC: 85 MG/DL (ref 65–100)
POTASSIUM SERPL-SCNC: 3.7 MMOL/L (ref 3.5–5.1)
SODIUM SERPL-SCNC: 143 MMOL/L (ref 136–145)

## 2021-12-03 ENCOUNTER — OFFICE VISIT (OUTPATIENT)
Dept: FAMILY MEDICINE CLINIC | Age: 56
End: 2021-12-03
Payer: SUBSIDIZED

## 2021-12-03 VITALS
HEART RATE: 78 BPM | HEIGHT: 61 IN | OXYGEN SATURATION: 98 % | RESPIRATION RATE: 18 BRPM | DIASTOLIC BLOOD PRESSURE: 82 MMHG | SYSTOLIC BLOOD PRESSURE: 158 MMHG | BODY MASS INDEX: 35.27 KG/M2 | WEIGHT: 186.8 LBS | TEMPERATURE: 98.1 F

## 2021-12-03 DIAGNOSIS — E78.2 MIXED HYPERLIPIDEMIA: ICD-10-CM

## 2021-12-03 DIAGNOSIS — Q85.00 NEUROFIBROMATOSIS (HCC): ICD-10-CM

## 2021-12-03 DIAGNOSIS — I10 ESSENTIAL HYPERTENSION: Primary | ICD-10-CM

## 2021-12-03 PROCEDURE — 99213 OFFICE O/P EST LOW 20 MIN: CPT | Performed by: FAMILY MEDICINE

## 2021-12-03 RX ORDER — LISINOPRIL AND HYDROCHLOROTHIAZIDE 20; 25 MG/1; MG/1
1 TABLET ORAL 2 TIMES DAILY
Qty: 60 TABLET | Refills: 5 | Status: SHIPPED | OUTPATIENT
Start: 2021-12-03

## 2021-12-03 NOTE — PROGRESS NOTES
Piper Marks is a 61 y.o. female who presents with the following:  Chief Complaint   Patient presents with    Hypertension    Medication Refill       Patient with hypertension neurofibromatosis and hyperlipidemia mixed type comes in today for refill of medication as she has been out for about a week and her blood pressure is not surprisingly elevated. 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 The patient is having no neurological symptoms for neurofibromatosis at this time and she is not taking any medication for her hyperlipidemia. I told the patient not like to have her come back in in 2 weeks to have her blood pressure rechecked and possibly to get more lab work ordered. Allergies   Allergen Reactions    Sulfa (Sulfonamide Antibiotics) Hives    Toviaz [Fesoterodine] Other (comments)     Chest pain       Current Outpatient Medications   Medication Sig    lisinopril-hydroCHLOROthiazide (PRINZIDE, ZESTORETIC) 20-25 mg per tablet Take 1 Tablet by mouth two (2) times a day. No current facility-administered medications for this visit.        Past Medical History:   Diagnosis Date    Arthritis     Essential hypertension     GERD (gastroesophageal reflux disease)     Hyperlipidemia     Neurofibromatosis ([address]     Pancreatitis        Past Surgical History:   Procedure Laterality Date    HX CARPAL TUNNEL RELEASE      HX SHOULDER ARTHROSCOPY Bilateral     HX TAY AND BSO         Family History   Problem Relation Age of Onset    Other Mother         neurofibromatosis and MR    Heart Disease Father     Other Brother         MR    Thyroid Cancer Child        Social History     Socioeconomic History    Marital status:    Tobacco Use    Smoking status: Never Smoker    Smokeless tobacco: Never Used   Substance and Sexual Activity    Alcohol use: No    Drug use: No       Review of Systems   Constitutional: Negative for chills, fever, malaise/fatigue and weight loss. HENT: Negative for congestion, hearing loss, sore throat and tinnitus. Eyes: Negative for blurred vision, pain and discharge. Respiratory: Negative for cough, shortness of breath and wheezing. Cardiovascular: Negative for chest pain, palpitations, orthopnea, claudication and leg swelling. Gastrointestinal: Negative for abdominal pain, constipation and heartburn. Genitourinary: Negative for dysuria, frequency and urgency. Musculoskeletal: Negative for falls, joint pain and myalgias. Skin: Negative for itching and rash. Neurological: Negative for dizziness, tingling, tremors and headaches. Endo/Heme/Allergies: Negative for environmental allergies and polydipsia. Psychiatric/Behavioral: Negative for depression and substance abuse. The patient is not nervous/anxious. Visit Vitals  BP (!) 158/82 (BP 1 Location: Left arm)   Pulse 78   Temp 98.1 °F (36.7 °C)   Resp 18   Ht 5' 1\" (1.549 m)   Wt 186 lb 12.8 oz (84.7 kg)   SpO2 98%   BMI 35.30 kg/m²     Physical Exam  Vitals reviewed. Constitutional:       General: She is not in acute distress. Appearance: Normal appearance. She is obese. She is not ill-appearing. HENT:      Head: Normocephalic and atraumatic. Right Ear: Tympanic membrane, ear canal and external ear normal.      Left Ear: Tympanic membrane, ear canal and external ear normal.      Nose: Nose normal. No congestion or rhinorrhea. Mouth/Throat:      Mouth: Mucous membranes are moist.      Pharynx: No posterior oropharyngeal erythema. Eyes:      General:         Right eye: No discharge. Left eye: No discharge. Extraocular Movements: Extraocular movements intact. Conjunctiva/sclera: Conjunctivae normal.      Pupils: Pupils are equal, round, and reactive to light.       Comments: Cornea anterior chamber and iris are normal.   Neck:      Trachea: No tracheal deviation. Cardiovascular:      Rate and Rhythm: Normal rate and regular rhythm. Pulses: Normal pulses. Heart sounds: Normal heart sounds. No murmur heard. No friction rub. No gallop. Pulmonary:      Effort: Pulmonary effort is normal. No respiratory distress. Breath sounds: Normal breath sounds. No wheezing or rhonchi. Chest:      Chest wall: No tenderness. Abdominal:      General: Bowel sounds are normal. There is no distension. Palpations: Abdomen is soft. There is no mass. Tenderness: There is no abdominal tenderness. There is no guarding or rebound. Musculoskeletal:         General: No tenderness or deformity. Cervical back: Normal range of motion and neck supple. Right lower leg: No edema. Left lower leg: No edema. Lymphadenopathy:      Cervical: No cervical adenopathy. Skin:     General: Skin is warm and dry. Coloration: Skin is not pale. Findings: No erythema or rash. Neurological:      General: No focal deficit present. Mental Status: She is alert and oriented to person, place, and time. Cranial Nerves: No cranial nerve deficit. Motor: No abnormal muscle tone. Deep Tendon Reflexes: Reflexes are normal and symmetric. Reflexes normal.      Comments: Cranial nerves II through XII are intact sensory and motor. Biceps triceps knee and ankle DTRs are normal and symmetrical.   Psychiatric:         Mood and Affect: Mood normal.         Behavior: Behavior normal.           ICD-10-CM ICD-9-CM    1. Essential hypertension  I10 401.9 lisinopril-hydroCHLOROthiazide (PRINZIDE, ZESTORETIC) 20-25 mg per tablet       Orders Placed This Encounter    lisinopril-hydroCHLOROthiazide (PRINZIDE, ZESTORETIC) 20-25 mg per tablet     Sig: Take 1 Tablet by mouth two (2) times a day.      Dispense:  60 Tablet     Refill:  5           Ruddy Minaya MD

## 2021-12-03 NOTE — PROGRESS NOTES
1. Have you been to the ER, urgent care clinic since your last visit? Hospitalized since your last visit?no    2. Have you seen or consulted any other health care providers outside of the [address] [place] [address] since your last visit? Include any pap smears or colon screening.  no

## 2022-01-12 ENCOUNTER — HOSPITAL ENCOUNTER (OUTPATIENT)
Dept: LAB | Age: 57
Discharge: HOME | End: 2022-01-12

## 2022-01-12 PROCEDURE — 80307 DRUG TEST PRSMV CHEM ANLYZR: CPT

## 2022-01-13 LAB
AMPHET UR QL SCN: NEGATIVE
BARBITURATES UR QL SCN: NEGATIVE
BENZODIAZ UR QL: NEGATIVE
CANNABINOIDS UR QL SCN: NEGATIVE
COCAINE UR QL SCN: NEGATIVE
DRUG SCRN COMMENT,DRGCM: NORMAL
METHADONE UR QL: NEGATIVE
OPIATES UR QL: NEGATIVE
PCP UR QL: NEGATIVE

## 2022-03-18 PROBLEM — E78.2 MIXED HYPERLIPIDEMIA: Status: ACTIVE | Noted: 2017-02-09

## 2022-03-19 PROBLEM — K21.9 GASTROESOPHAGEAL REFLUX DISEASE WITHOUT ESOPHAGITIS: Status: ACTIVE | Noted: 2017-02-09

## 2022-03-19 PROBLEM — R41.3 MEMORY LOSS: Status: ACTIVE | Noted: 2021-04-07

## 2022-06-07 ENCOUNTER — HOSPITAL ENCOUNTER (OUTPATIENT)
Dept: LAB | Age: 57
Discharge: HOME | End: 2022-06-07

## 2022-06-07 PROCEDURE — 80053 COMPREHEN METABOLIC PANEL: CPT

## 2022-06-07 PROCEDURE — 82746 ASSAY OF FOLIC ACID SERUM: CPT

## 2022-06-07 PROCEDURE — 82607 VITAMIN B-12: CPT

## 2022-06-07 PROCEDURE — 80061 LIPID PANEL: CPT

## 2022-06-07 PROCEDURE — 85025 COMPLETE CBC W/AUTO DIFF WBC: CPT

## 2022-06-07 PROCEDURE — 83036 HEMOGLOBIN GLYCOSYLATED A1C: CPT

## 2022-06-07 PROCEDURE — 82306 VITAMIN D 25 HYDROXY: CPT

## 2022-06-08 LAB
25(OH)D3 SERPL-MCNC: 19.6 NG/ML (ref 30–100)
ALBUMIN SERPL-MCNC: 3.7 G/DL (ref 3.5–5)
ALBUMIN/GLOB SERPL: 0.9 {RATIO} (ref 1.1–2.2)
ALP SERPL-CCNC: 74 U/L (ref 45–117)
ALT SERPL-CCNC: 20 U/L (ref 12–78)
ANION GAP SERPL CALC-SCNC: 10 MMOL/L (ref 5–15)
AST SERPL-CCNC: 14 U/L (ref 15–37)
BASOPHILS # BLD: 0.1 K/UL (ref 0–0.1)
BASOPHILS NFR BLD: 1 % (ref 0–1)
BILIRUB SERPL-MCNC: 0.3 MG/DL (ref 0.2–1)
BUN SERPL-MCNC: 20 MG/DL (ref 6–20)
BUN/CREAT SERPL: 22 (ref 12–20)
CALCIUM SERPL-MCNC: 9.1 MG/DL (ref 8.5–10.1)
CHLORIDE SERPL-SCNC: 102 MMOL/L (ref 97–108)
CHOLEST SERPL-MCNC: 259 MG/DL
CO2 SERPL-SCNC: 31 MMOL/L (ref 21–32)
CREAT SERPL-MCNC: 0.93 MG/DL (ref 0.55–1.02)
DIFFERENTIAL METHOD BLD: ABNORMAL
EOSINOPHIL # BLD: 0.1 K/UL (ref 0–0.4)
EOSINOPHIL NFR BLD: 1 % (ref 0–7)
ERYTHROCYTE [DISTWIDTH] IN BLOOD BY AUTOMATED COUNT: 13.6 % (ref 11.5–14.5)
EST. AVERAGE GLUCOSE BLD GHB EST-MCNC: 128 MG/DL
GLOBULIN SER CALC-MCNC: 4 G/DL (ref 2–4)
GLUCOSE SERPL-MCNC: 103 MG/DL (ref 65–100)
HBA1C MFR BLD: 6.1 % (ref 4–5.6)
HCT VFR BLD AUTO: 35.8 % (ref 35–47)
HDLC SERPL-MCNC: 53 MG/DL
HDLC SERPL: 4.9 {RATIO} (ref 0–5)
HGB BLD-MCNC: 11.4 G/DL (ref 11.5–16)
IMM GRANULOCYTES # BLD AUTO: 0 K/UL (ref 0–0.04)
IMM GRANULOCYTES NFR BLD AUTO: 0 % (ref 0–0.5)
LDLC SERPL CALC-MCNC: 186.4 MG/DL (ref 0–100)
LYMPHOCYTES # BLD: 2.7 K/UL (ref 0.8–3.5)
LYMPHOCYTES NFR BLD: 35 % (ref 12–49)
MCH RBC QN AUTO: 28.1 PG (ref 26–34)
MCHC RBC AUTO-ENTMCNC: 31.8 G/DL (ref 30–36.5)
MCV RBC AUTO: 88.2 FL (ref 80–99)
MONOCYTES # BLD: 0.5 K/UL (ref 0–1)
MONOCYTES NFR BLD: 7 % (ref 5–13)
NEUTS SEG # BLD: 4.3 K/UL (ref 1.8–8)
NEUTS SEG NFR BLD: 56 % (ref 32–75)
NRBC # BLD: 0 K/UL (ref 0–0.01)
NRBC BLD-RTO: 0 PER 100 WBC
PLATELET # BLD AUTO: 272 K/UL (ref 150–400)
PMV BLD AUTO: 10.3 FL (ref 8.9–12.9)
POTASSIUM SERPL-SCNC: 3.2 MMOL/L (ref 3.5–5.1)
PROT SERPL-MCNC: 7.7 G/DL (ref 6.4–8.2)
RBC # BLD AUTO: 4.06 M/UL (ref 3.8–5.2)
SODIUM SERPL-SCNC: 143 MMOL/L (ref 136–145)
TRIGL SERPL-MCNC: 98 MG/DL (ref ?–150)
VIT B12 SERPL-MCNC: 433 PG/ML (ref 193–986)
VLDLC SERPL CALC-MCNC: 19.6 MG/DL
WBC # BLD AUTO: 7.7 K/UL (ref 3.6–11)

## 2022-06-09 LAB — FOLATE SERPL-MCNC: 5.8 NG/ML

## 2022-07-01 ENCOUNTER — HOSPITAL ENCOUNTER (OUTPATIENT)
Dept: LAB | Age: 57
Discharge: HOME | End: 2022-07-01

## 2022-07-01 LAB
CHOLEST SERPL-MCNC: 243 MG/DL
HDLC SERPL-MCNC: 56 MG/DL
HDLC SERPL: 4.3 {RATIO} (ref 0–5)
LDLC SERPL CALC-MCNC: 175.6 MG/DL (ref 0–100)
TRIGL SERPL-MCNC: 57 MG/DL (ref ?–150)
VLDLC SERPL CALC-MCNC: 11.4 MG/DL

## 2022-07-01 PROCEDURE — 80061 LIPID PANEL: CPT

## 2022-08-31 ENCOUNTER — HOSPITAL ENCOUNTER (OUTPATIENT)
Dept: MAMMOGRAPHY | Age: 57
Discharge: HOME | End: 2022-08-31
Attending: FAMILY MEDICINE

## 2022-08-31 VITALS — BODY MASS INDEX: 33.25 KG/M2 | WEIGHT: 176 LBS

## 2022-08-31 DIAGNOSIS — Z12.31 VISIT FOR SCREENING MAMMOGRAM: ICD-10-CM

## 2022-08-31 PROCEDURE — 77067 SCR MAMMO BI INCL CAD: CPT

## 2022-09-29 ENCOUNTER — TRANSCRIBE ORDER (OUTPATIENT)
Dept: REGISTRATION | Age: 57
End: 2022-09-29

## 2022-09-29 ENCOUNTER — HOSPITAL ENCOUNTER (OUTPATIENT)
Dept: GENERAL RADIOLOGY | Age: 57
Discharge: HOME | End: 2022-09-29

## 2022-09-29 DIAGNOSIS — M54.50 LOW BACK PAIN: Primary | ICD-10-CM

## 2022-09-29 DIAGNOSIS — M25.552 BILATERAL HIP PAIN: ICD-10-CM

## 2022-09-29 DIAGNOSIS — M25.551 BILATERAL HIP PAIN: ICD-10-CM

## 2022-09-29 DIAGNOSIS — M54.50 LOW BACK PAIN: ICD-10-CM

## 2022-09-29 PROCEDURE — 72100 X-RAY EXAM L-S SPINE 2/3 VWS: CPT

## 2022-09-29 PROCEDURE — 73521 X-RAY EXAM HIPS BI 2 VIEWS: CPT

## 2022-12-10 ENCOUNTER — HOSPITAL ENCOUNTER (EMERGENCY)
Age: 57
Discharge: HOME | End: 2022-12-10
Attending: EMERGENCY MEDICINE

## 2022-12-10 VITALS
SYSTOLIC BLOOD PRESSURE: 138 MMHG | TEMPERATURE: 99.2 F | BODY MASS INDEX: 33.07 KG/M2 | HEART RATE: 91 BPM | DIASTOLIC BLOOD PRESSURE: 86 MMHG | OXYGEN SATURATION: 98 % | RESPIRATION RATE: 12 BRPM | WEIGHT: 175 LBS

## 2022-12-10 DIAGNOSIS — L03.012 PARONYCHIA OF LEFT THUMB: Primary | ICD-10-CM

## 2022-12-10 PROCEDURE — 99282 EMERGENCY DEPT VISIT SF MDM: CPT

## 2022-12-10 PROCEDURE — 75810000451 HC DRAIN ABSC FINGER SIMPLE

## 2022-12-10 PROCEDURE — 74011000250 HC RX REV CODE- 250: Performed by: EMERGENCY MEDICINE

## 2022-12-10 RX ORDER — LIDOCAINE HYDROCHLORIDE 10 MG/ML
5 INJECTION, SOLUTION EPIDURAL; INFILTRATION; INTRACAUDAL; PERINEURAL ONCE
Status: COMPLETED | OUTPATIENT
Start: 2022-12-10 | End: 2022-12-10

## 2022-12-10 RX ORDER — CEPHALEXIN 500 MG/1
500 CAPSULE ORAL 3 TIMES DAILY
Qty: 21 CAPSULE | Refills: 0 | Status: SHIPPED | OUTPATIENT
Start: 2022-12-10 | End: 2022-12-17

## 2022-12-10 RX ADMIN — LIDOCAINE HYDROCHLORIDE 5 ML: 10 INJECTION, SOLUTION EPIDURAL; INFILTRATION; INTRACAUDAL; PERINEURAL at 17:00

## 2022-12-10 NOTE — Clinical Note
[address] [place] [address] EMERGENCY DEP  [address] [facility] [address] [place] [address]  [phone]    Work/School Note    Date: 12/10/2022    To Whom It May concern:    Marianne Cobian was seen and treated today in the emergency room by the following provider(s):  Attending Provider: Kristie Reyes., MD.      Jacqueline Chery is excused from work/school on 12/10/22 and 12/11/22. She is medically clear to return to work/school on 12/12/2022. Sincerely,          Pratibha Agarwal.  MD Marie

## 2022-12-10 NOTE — ED PROVIDER NOTES
EMERGENCY DEPARTMENT HISTORY AND PHYSICAL EXAM          Date: 12/10/2022  Patient Name: Kylie Milian    History of Presenting Illness     Chief Complaint   Patient presents with    Wound Check       History Provided By: Patient    HPI: Kylie Milian is a 61 y.o. female, pmhx neuro fibrosis, hypertension, high cholesterol, who presents ambulatory to the ED c/o finger wound    Nodule on left thumb present for about one week and now open and bleeding. She states it feels swollen in her finger and tender. Sometimes it drains. She would like a nodule removed. PCP: Sukhi Zhao MD    Allergies: sulfa  There are no other complaints, changes, or physical findings at this time. Current Outpatient Medications   Medication Sig Dispense Refill    cephALEXin (Keflex) 500 mg capsule Take 1 Capsule by mouth three (3) times daily for 7 days. 21 Capsule 0    lisinopril-hydroCHLOROthiazide (PRINZIDE, ZESTORETIC) 20-25 mg per tablet Take 1 Tablet by mouth two (2) times a day. 60 Tablet 5       Past History     Past Medical History:  Past Medical History:   Diagnosis Date    Arthritis     Essential hypertension     GERD (gastroesophageal reflux disease)     Hyperlipidemia     Neurofibromatosis ([facility] [address]     Pancreatitis        Past Surgical History:  Past Surgical History:   Procedure Laterality Date    HX CARPAL TUNNEL RELEASE      HX SHOULDER ARTHROSCOPY Bilateral     HX TAY AND BSO         Family History:  Family History   Problem Relation Age of Onset    Other Mother         neurofibromatosis and MR    Heart Disease Father     Other Brother         MR    Thyroid Cancer Child        Social History:  Social History     Tobacco Use    Smoking status: Never    Smokeless tobacco: Never   Substance Use Topics    Alcohol use: No    Drug use: No       Allergies:   Allergies   Allergen Reactions    Sulfa (Sulfonamide Antibiotics) Hives    Toviaz [Fesoterodine] Other (comments)     Chest pain         Review of Systems Review of Systems   Constitutional:  Negative for activity change, appetite change, chills, fever and unexpected weight change. HENT:  Negative for congestion. Eyes:  Negative for pain and visual disturbance. Respiratory:  Negative for cough and shortness of breath. Cardiovascular:  Negative for chest pain. Gastrointestinal:  Negative for abdominal pain, diarrhea, nausea and vomiting. Genitourinary:  Negative for dysuria. Musculoskeletal:  Negative for back pain. Skin:  Positive for wound. Negative for rash. Neurological:  Negative for headaches. Physical Exam   Physical Exam  Vitals and nursing note reviewed. Constitutional:       Appearance: She is well-developed. She is not diaphoretic. Comments: Middle-aged female with normal vital signs in minimal acute distress   HENT:      Head: Normocephalic and atraumatic. Eyes:      General:         Right eye: No discharge. Left eye: No discharge. Conjunctiva/sclera: Conjunctivae normal.      Pupils: Pupils are equal, round, and reactive to light. Cardiovascular:      Rate and Rhythm: Normal rate and regular rhythm. Pulses: Normal pulses. Pulmonary:      Effort: Pulmonary effort is normal. No respiratory distress. Breath sounds: Normal breath sounds. Abdominal:      General: There is no distension. Musculoskeletal:         General: Normal range of motion. Cervical back: Normal range of motion and neck supple. Skin:     General: Skin is warm and dry. Findings: No rash. Comments: Multiple nodules noted throughout patient's entire skin consistent with neurofibromatosis. Left thumb with a nodule that is open and exposed and slightly oozing. Along the cuticle of the nail there appears to be some induration and it extends just to the base of the nail around the outside of the nodule.   Does not extend beyond the DIP joint   Neurological:      Mental Status: She is alert and oriented to person, place, and time. Cranial Nerves: No cranial nerve deficit. Motor: No abnormal muscle tone. Diagnostic Study Results     Labs -   No results found for this or any previous visit (from the past 12 hour(s)). Radiologic Studies -   No orders to display     CT Results  (Last 48 hours)      None          CXR Results  (Last 48 hours)      None              Medical Decision Making   I am the first provider for this patient. I reviewed the vital signs, available nursing notes, past medical history, past surgical history, family history and social history. Vital Signs-Reviewed the patient's vital signs. Pulse Oximetry Analysis - 98% on RA    Records Reviewed: Nursing Notes and Old Medical Records    Provider Notes (Medical Decision Making):   MDM: Elderly female with neurofibromatosis presents ambulatory with normal vital signs the left thumb nodule that she would like removed. I explained that we do not do that sort of thing in the emergency room but I can refer her to a specialist that can remove this nodule if the infection does not improve. At current she does appear to have an underlying paronychia along the site of the extension of this nodule and distally along the base of the nail. I explained that we can I&D the steroid of the infection and start her on antibiotics to which patient agrees. ED Course:   Initial assessment performed. The patients presenting problems have been discussed, and they are in agreement with the care plan formulated and outlined with them. I have encouraged them to ask questions as they arise throughout their visit. ED Course as of 12/11/22 0746   Sat Dec 10, 2022   1848 Procedure Note - Digital Block:   Performed by: Zenia Curtis MD   Lidocaine 1% without epinephrine used to perform digital block of Left thumb finger(s). The procedure took 1-15 minutes, and pt tolerated well.    [JT]      ED Course User Index  [JT] Panfilo Whalen MD 5:30  Using Betadine to clean the skin a #10 blade was used to lift the cuticle off of the nailbed to express drainage from the base of the finger. In total procedure took 7 minutes and patient tolerated well. Discharge note:  Pt re-evaluated and noted to be feeling better, ready for discharge. Will follow up as instructed General surgery. All questions have been answered, pt voiced understanding and agreement with plan. Specific return precautions provided as well as instructions to return to the ED should sx worsen at any time. Vital signs stable for discharge. Critical Care Time:   0      Diagnosis     Clinical Impression:   1. Paronychia of left thumb        PLAN:  1. Discharge Medication List as of 12/10/2022  5:41 PM        START taking these medications    Details   cephALEXin (Keflex) 500 mg capsule Take 1 Capsule by mouth three (3) times daily for 7 days. , Normal, Disp-21 Capsule, R-0           CONTINUE these medications which have NOT CHANGED    Details   lisinopril-hydroCHLOROthiazide (PRINZIDE, ZESTORETIC) 20-25 mg per tablet Take 1 Tablet by mouth two (2) times a day., Normal, Disp-60 Tablet, R-5           2. Follow-up Information       Follow up With Specialties Details Why Contact Info    Valentino Aldrich, MD General Surgery Schedule an appointment as soon as possible for a visit   [address] [address]  [phone]      [address] [facility] Emergency Medicine  If symptoms worsen [address] [phone]          Return to ED if worse     Disposition:  Home       Please note, this dictation was completed with [employer], the [email or web address] voice recognition software. Quite often unanticipated grammatical, syntax, homophones, and other interpretive errors are inadvertently transcribed by the computer software. Please disregard these errors. Please excuse any errors that have escaped final proof reading.

## 2022-12-10 NOTE — DISCHARGE INSTRUCTIONS
You were seen in the emergency room for infection under this wound. The wound has been opened and drained and we are starting you on an antibiotic. You can use Epson salt soaks for the next 2 to 3 days to help the wound continuing to drain. Then use a nonstick dressing and a Band-Aid to cover the wound.

## 2022-12-19 ENCOUNTER — HOSPITAL ENCOUNTER (OUTPATIENT)
Dept: LAB | Age: 57
Discharge: HOME | End: 2022-12-19

## 2022-12-19 PROCEDURE — 82306 VITAMIN D 25 HYDROXY: CPT

## 2022-12-19 PROCEDURE — 85025 COMPLETE CBC W/AUTO DIFF WBC: CPT

## 2022-12-19 PROCEDURE — 80053 COMPREHEN METABOLIC PANEL: CPT

## 2022-12-19 PROCEDURE — 80061 LIPID PANEL: CPT

## 2022-12-20 LAB
ALBUMIN SERPL-MCNC: 4 G/DL (ref 3.5–5)
ALBUMIN/GLOB SERPL: 0.9 {RATIO} (ref 1.1–2.2)
ALP SERPL-CCNC: 79 U/L (ref 45–117)
ALT SERPL-CCNC: 21 U/L (ref 12–78)
ANION GAP SERPL CALC-SCNC: 7 MMOL/L (ref 5–15)
AST SERPL-CCNC: 13 U/L (ref 15–37)
BASOPHILS # BLD: 0 K/UL (ref 0–0.1)
BASOPHILS NFR BLD: 0 % (ref 0–1)
BILIRUB SERPL-MCNC: 0.4 MG/DL (ref 0.2–1)
BUN SERPL-MCNC: 19 MG/DL (ref 6–20)
BUN/CREAT SERPL: 19 (ref 12–20)
CALCIUM SERPL-MCNC: 9.6 MG/DL (ref 8.5–10.1)
CHLORIDE SERPL-SCNC: 102 MMOL/L (ref 97–108)
CHOLEST SERPL-MCNC: 215 MG/DL
CO2 SERPL-SCNC: 33 MMOL/L (ref 21–32)
CREAT SERPL-MCNC: 0.99 MG/DL (ref 0.55–1.02)
DIFFERENTIAL METHOD BLD: NORMAL
EOSINOPHIL # BLD: 0.1 K/UL (ref 0–0.4)
EOSINOPHIL NFR BLD: 1 % (ref 0–7)
ERYTHROCYTE [DISTWIDTH] IN BLOOD BY AUTOMATED COUNT: 13.6 % (ref 11.5–14.5)
GLOBULIN SER CALC-MCNC: 4.4 G/DL (ref 2–4)
GLUCOSE SERPL-MCNC: 98 MG/DL (ref 65–100)
HCT VFR BLD AUTO: 39.3 % (ref 35–47)
HDLC SERPL-MCNC: 61 MG/DL
HDLC SERPL: 3.5 {RATIO} (ref 0–5)
HGB BLD-MCNC: 12.7 G/DL (ref 11.5–16)
IMM GRANULOCYTES # BLD AUTO: 0 K/UL (ref 0–0.04)
IMM GRANULOCYTES NFR BLD AUTO: 0 % (ref 0–0.5)
LDLC SERPL CALC-MCNC: 134.8 MG/DL (ref 0–100)
LYMPHOCYTES # BLD: 2.8 K/UL (ref 0.8–3.5)
LYMPHOCYTES NFR BLD: 37 % (ref 12–49)
MCH RBC QN AUTO: 28.5 PG (ref 26–34)
MCHC RBC AUTO-ENTMCNC: 32.3 G/DL (ref 30–36.5)
MCV RBC AUTO: 88.3 FL (ref 80–99)
MONOCYTES # BLD: 0.5 K/UL (ref 0–1)
MONOCYTES NFR BLD: 7 % (ref 5–13)
NEUTS SEG # BLD: 4.2 K/UL (ref 1.8–8)
NEUTS SEG NFR BLD: 55 % (ref 32–75)
NRBC # BLD: 0 K/UL (ref 0–0.01)
NRBC BLD-RTO: 0 PER 100 WBC
PLATELET # BLD AUTO: 317 K/UL (ref 150–400)
PMV BLD AUTO: 10.3 FL (ref 8.9–12.9)
POTASSIUM SERPL-SCNC: 3.8 MMOL/L (ref 3.5–5.1)
PROT SERPL-MCNC: 8.4 G/DL (ref 6.4–8.2)
RBC # BLD AUTO: 4.45 M/UL (ref 3.8–5.2)
SODIUM SERPL-SCNC: 142 MMOL/L (ref 136–145)
TRIGL SERPL-MCNC: 96 MG/DL (ref ?–150)
VLDLC SERPL CALC-MCNC: 19.2 MG/DL
WBC # BLD AUTO: 7.7 K/UL (ref 3.6–11)

## 2022-12-21 LAB — 25(OH)D3 SERPL-MCNC: 109 NG/ML (ref 30–100)

## 2023-05-26 ENCOUNTER — HOSPITAL ENCOUNTER (OUTPATIENT)
Facility: HOSPITAL | Age: 58
Discharge: HOME | End: 2023-05-29

## 2023-05-26 LAB
ALBUMIN SERPL-MCNC: 3.8 G/DL (ref 3.5–5)
ALBUMIN/GLOB SERPL: 0.8 (ref 1.1–2.2)
ALP SERPL-CCNC: 68 U/L (ref 45–117)
ALT SERPL-CCNC: 20 U/L (ref 12–78)
ANION GAP SERPL CALC-SCNC: 6 MMOL/L (ref 5–15)
AST SERPL-CCNC: 13 U/L (ref 15–37)
BASOPHILS # BLD: 0 K/UL (ref 0–0.1)
BASOPHILS NFR BLD: 0 % (ref 0–1)
BILIRUB SERPL-MCNC: 0.4 MG/DL (ref 0.2–1)
BUN SERPL-MCNC: 15 MG/DL (ref 6–20)
BUN/CREAT SERPL: 18 (ref 12–20)
CALCIUM SERPL-MCNC: 9.3 MG/DL (ref 8.5–10.1)
CHLORIDE SERPL-SCNC: 102 MMOL/L (ref 97–108)
CHOLEST SERPL-MCNC: 195 MG/DL
CO2 SERPL-SCNC: 33 MMOL/L (ref 21–32)
CREAT SERPL-MCNC: 0.83 MG/DL (ref 0.55–1.02)
DIFFERENTIAL METHOD BLD: ABNORMAL
EOSINOPHIL # BLD: 0.1 K/UL (ref 0–0.4)
EOSINOPHIL NFR BLD: 2 % (ref 0–7)
ERYTHROCYTE [DISTWIDTH] IN BLOOD BY AUTOMATED COUNT: 13.4 % (ref 11.5–14.5)
EST. AVERAGE GLUCOSE BLD GHB EST-MCNC: 134 MG/DL
GLOBULIN SER CALC-MCNC: 4.9 G/DL (ref 2–4)
GLUCOSE SERPL-MCNC: 104 MG/DL (ref 65–100)
HBA1C MFR BLD: 6.3 % (ref 4–5.6)
HCT VFR BLD AUTO: 39.1 % (ref 35–47)
HDLC SERPL-MCNC: 58 MG/DL
HDLC SERPL: 3.4 (ref 0–5)
HGB BLD-MCNC: 12.4 G/DL (ref 11.5–16)
IMM GRANULOCYTES # BLD AUTO: 0.1 K/UL (ref 0–0.04)
IMM GRANULOCYTES NFR BLD AUTO: 1 % (ref 0–0.5)
LDLC SERPL CALC-MCNC: 118 MG/DL (ref 0–100)
LYMPHOCYTES # BLD: 2.6 K/UL (ref 0.8–3.5)
LYMPHOCYTES NFR BLD: 33 % (ref 12–49)
MCH RBC QN AUTO: 28.6 PG (ref 26–34)
MCHC RBC AUTO-ENTMCNC: 31.7 G/DL (ref 30–36.5)
MCV RBC AUTO: 90.3 FL (ref 80–99)
MONOCYTES # BLD: 0.6 K/UL (ref 0–1)
MONOCYTES NFR BLD: 7 % (ref 5–13)
NEUTS SEG # BLD: 4.6 K/UL (ref 1.8–8)
NEUTS SEG NFR BLD: 57 % (ref 32–75)
NRBC # BLD: 0 K/UL (ref 0–0.01)
NRBC BLD-RTO: 0 PER 100 WBC
PLATELET # BLD AUTO: 251 K/UL (ref 150–400)
PMV BLD AUTO: 9.4 FL (ref 8.9–12.9)
POTASSIUM SERPL-SCNC: 3.7 MMOL/L (ref 3.5–5.1)
PROT SERPL-MCNC: 8.7 G/DL (ref 6.4–8.2)
RBC # BLD AUTO: 4.33 M/UL (ref 3.8–5.2)
SODIUM SERPL-SCNC: 141 MMOL/L (ref 136–145)
TRIGL SERPL-MCNC: 95 MG/DL
VLDLC SERPL CALC-MCNC: 19 MG/DL
WBC # BLD AUTO: 8 K/UL (ref 3.6–11)

## 2023-05-26 PROCEDURE — 80053 COMPREHEN METABOLIC PANEL: CPT

## 2023-05-26 PROCEDURE — 80061 LIPID PANEL: CPT

## 2023-05-26 PROCEDURE — 36415 COLL VENOUS BLD VENIPUNCTURE: CPT

## 2023-05-26 PROCEDURE — 85025 COMPLETE CBC W/AUTO DIFF WBC: CPT

## 2023-05-26 PROCEDURE — 83036 HEMOGLOBIN GLYCOSYLATED A1C: CPT

## 2023-08-07 ENCOUNTER — HOSPITAL ENCOUNTER (OUTPATIENT)
Facility: HOSPITAL | Age: 58
Discharge: HOME | End: 2023-08-10

## 2023-08-07 LAB
ALBUMIN SERPL-MCNC: 4.1 G/DL (ref 3.5–5)
ALBUMIN/GLOB SERPL: 1 (ref 1.1–2.2)
ALP SERPL-CCNC: 72 U/L (ref 45–117)
ALT SERPL-CCNC: 24 U/L (ref 12–78)
ANION GAP SERPL CALC-SCNC: 7 MMOL/L (ref 5–15)
AST SERPL-CCNC: 16 U/L (ref 15–37)
BASOPHILS # BLD: 0 K/UL (ref 0–0.1)
BASOPHILS NFR BLD: 0 % (ref 0–1)
BILIRUB SERPL-MCNC: 0.5 MG/DL (ref 0.2–1)
BUN SERPL-MCNC: 21 MG/DL (ref 6–20)
BUN/CREAT SERPL: 24 (ref 12–20)
CALCIUM SERPL-MCNC: 9.3 MG/DL (ref 8.5–10.1)
CHLORIDE SERPL-SCNC: 103 MMOL/L (ref 97–108)
CHOLEST SERPL-MCNC: 154 MG/DL
CO2 SERPL-SCNC: 31 MMOL/L (ref 21–32)
CREAT SERPL-MCNC: 0.88 MG/DL (ref 0.55–1.02)
DIFFERENTIAL METHOD BLD: NORMAL
EOSINOPHIL # BLD: 0.1 K/UL (ref 0–0.4)
EOSINOPHIL NFR BLD: 1 % (ref 0–7)
ERYTHROCYTE [DISTWIDTH] IN BLOOD BY AUTOMATED COUNT: 12.9 % (ref 11.5–14.5)
EST. AVERAGE GLUCOSE BLD GHB EST-MCNC: 123 MG/DL
GLOBULIN SER CALC-MCNC: 4.2 G/DL (ref 2–4)
GLUCOSE SERPL-MCNC: 95 MG/DL (ref 65–100)
HBA1C MFR BLD: 5.9 % (ref 4–5.6)
HCT VFR BLD AUTO: 38.9 % (ref 35–47)
HDLC SERPL-MCNC: 55 MG/DL
HDLC SERPL: 2.8 (ref 0–5)
HGB BLD-MCNC: 12.3 G/DL (ref 11.5–16)
IMM GRANULOCYTES # BLD AUTO: 0 K/UL (ref 0–0.04)
IMM GRANULOCYTES NFR BLD AUTO: 0 % (ref 0–0.5)
LDLC SERPL CALC-MCNC: 89.8 MG/DL (ref 0–100)
LYMPHOCYTES # BLD: 2.2 K/UL (ref 0.8–3.5)
LYMPHOCYTES NFR BLD: 39 % (ref 12–49)
MCH RBC QN AUTO: 28.5 PG (ref 26–34)
MCHC RBC AUTO-ENTMCNC: 31.6 G/DL (ref 30–36.5)
MCV RBC AUTO: 90.3 FL (ref 80–99)
MONOCYTES # BLD: 0.4 K/UL (ref 0–1)
MONOCYTES NFR BLD: 8 % (ref 5–13)
NEUTS SEG # BLD: 2.9 K/UL (ref 1.8–8)
NEUTS SEG NFR BLD: 52 % (ref 32–75)
NRBC # BLD: 0 K/UL (ref 0–0.01)
NRBC BLD-RTO: 0 PER 100 WBC
PLATELET # BLD AUTO: 250 K/UL (ref 150–400)
PMV BLD AUTO: 9.6 FL (ref 8.9–12.9)
POTASSIUM SERPL-SCNC: 3.6 MMOL/L (ref 3.5–5.1)
PROT SERPL-MCNC: 8.3 G/DL (ref 6.4–8.2)
RBC # BLD AUTO: 4.31 M/UL (ref 3.8–5.2)
SODIUM SERPL-SCNC: 141 MMOL/L (ref 136–145)
TRIGL SERPL-MCNC: 46 MG/DL
VLDLC SERPL CALC-MCNC: 9.2 MG/DL
WBC # BLD AUTO: 5.6 K/UL (ref 3.6–11)

## 2023-08-07 PROCEDURE — 80053 COMPREHEN METABOLIC PANEL: CPT

## 2023-08-07 PROCEDURE — 36415 COLL VENOUS BLD VENIPUNCTURE: CPT

## 2023-08-07 PROCEDURE — 83036 HEMOGLOBIN GLYCOSYLATED A1C: CPT

## 2023-08-07 PROCEDURE — 85025 COMPLETE CBC W/AUTO DIFF WBC: CPT

## 2023-08-07 PROCEDURE — 80061 LIPID PANEL: CPT

## 2024-04-16 ENCOUNTER — HOSPITAL ENCOUNTER (OUTPATIENT)
Facility: HOSPITAL | Age: 59
Setting detail: SPECIMEN
Discharge: HOME | End: 2024-04-19

## 2024-04-16 PROCEDURE — 82306 VITAMIN D 25 HYDROXY: CPT

## 2024-04-16 PROCEDURE — 85025 COMPLETE CBC W/AUTO DIFF WBC: CPT

## 2024-04-16 PROCEDURE — 80053 COMPREHEN METABOLIC PANEL: CPT

## 2024-04-16 PROCEDURE — 83036 HEMOGLOBIN GLYCOSYLATED A1C: CPT

## 2024-04-16 PROCEDURE — 80061 LIPID PANEL: CPT

## 2024-04-17 LAB
25(OH)D3 SERPL-MCNC: 58.5 NG/ML (ref 30–100)
ALBUMIN SERPL-MCNC: 3.6 G/DL (ref 3.5–5)
ALBUMIN/GLOB SERPL: 0.9 (ref 1.1–2.2)
ALP SERPL-CCNC: 65 U/L (ref 45–117)
ALT SERPL-CCNC: 46 U/L (ref 12–78)
ANION GAP SERPL CALC-SCNC: 9 MMOL/L (ref 5–15)
AST SERPL-CCNC: 25 U/L (ref 15–37)
BASOPHILS # BLD: 0 K/UL (ref 0–0.1)
BASOPHILS NFR BLD: 1 % (ref 0–1)
BILIRUB SERPL-MCNC: 0.4 MG/DL (ref 0.2–1)
BUN SERPL-MCNC: 13 MG/DL (ref 6–20)
BUN/CREAT SERPL: 17 (ref 12–20)
CALCIUM SERPL-MCNC: 9.2 MG/DL (ref 8.5–10.1)
CHLORIDE SERPL-SCNC: 104 MMOL/L (ref 97–108)
CHOLEST SERPL-MCNC: 219 MG/DL
CO2 SERPL-SCNC: 29 MMOL/L (ref 21–32)
CREAT SERPL-MCNC: 0.77 MG/DL (ref 0.55–1.02)
DIFFERENTIAL METHOD BLD: NORMAL
EOSINOPHIL # BLD: 0.1 K/UL (ref 0–0.4)
EOSINOPHIL NFR BLD: 1 % (ref 0–7)
ERYTHROCYTE [DISTWIDTH] IN BLOOD BY AUTOMATED COUNT: 14.4 % (ref 11.5–14.5)
EST. AVERAGE GLUCOSE BLD GHB EST-MCNC: 117 MG/DL
GLOBULIN SER CALC-MCNC: 3.8 G/DL (ref 2–4)
GLUCOSE SERPL-MCNC: 98 MG/DL (ref 65–100)
HBA1C MFR BLD: 5.7 % (ref 4–5.6)
HCT VFR BLD AUTO: 37 % (ref 35–47)
HDLC SERPL-MCNC: 57 MG/DL
HDLC SERPL: 3.8 (ref 0–5)
HGB BLD-MCNC: 11.7 G/DL (ref 11.5–16)
IMM GRANULOCYTES # BLD AUTO: 0 K/UL (ref 0–0.04)
IMM GRANULOCYTES NFR BLD AUTO: 0 % (ref 0–0.5)
LDLC SERPL CALC-MCNC: 141 MG/DL (ref 0–100)
LYMPHOCYTES # BLD: 2.2 K/UL (ref 0.8–3.5)
LYMPHOCYTES NFR BLD: 36 % (ref 12–49)
MCH RBC QN AUTO: 28.6 PG (ref 26–34)
MCHC RBC AUTO-ENTMCNC: 31.6 G/DL (ref 30–36.5)
MCV RBC AUTO: 90.5 FL (ref 80–99)
MONOCYTES # BLD: 0.5 K/UL (ref 0–1)
MONOCYTES NFR BLD: 8 % (ref 5–13)
NEUTS SEG # BLD: 3.3 K/UL (ref 1.8–8)
NEUTS SEG NFR BLD: 54 % (ref 32–75)
NRBC # BLD: 0 K/UL (ref 0–0.01)
NRBC BLD-RTO: 0 PER 100 WBC
PLATELET # BLD AUTO: 280 K/UL (ref 150–400)
PMV BLD AUTO: 10 FL (ref 8.9–12.9)
POTASSIUM SERPL-SCNC: 4.4 MMOL/L (ref 3.5–5.1)
PROT SERPL-MCNC: 7.4 G/DL (ref 6.4–8.2)
RBC # BLD AUTO: 4.09 M/UL (ref 3.8–5.2)
SODIUM SERPL-SCNC: 142 MMOL/L (ref 136–145)
TRIGL SERPL-MCNC: 105 MG/DL
VLDLC SERPL CALC-MCNC: 21 MG/DL
WBC # BLD AUTO: 6 K/UL (ref 3.6–11)

## 2025-01-15 ENCOUNTER — HOSPITAL ENCOUNTER (OUTPATIENT)
Facility: HOSPITAL | Age: 60
Setting detail: SPECIMEN
Discharge: HOME | End: 2025-01-18

## 2025-01-15 LAB
ALBUMIN SERPL-MCNC: 3.8 G/DL (ref 3.5–5)
ALBUMIN/GLOB SERPL: 0.8 (ref 1.1–2.2)
ALP SERPL-CCNC: 74 U/L (ref 45–117)
ALT SERPL-CCNC: 31 U/L (ref 12–78)
ANION GAP SERPL CALC-SCNC: 9 MMOL/L (ref 2–12)
AST SERPL-CCNC: 19 U/L (ref 15–37)
BASOPHILS # BLD: 0.01 K/UL (ref 0–0.1)
BASOPHILS NFR BLD: 0.2 % (ref 0–1)
BILIRUB SERPL-MCNC: 0.4 MG/DL (ref 0.2–1)
BUN SERPL-MCNC: 14 MG/DL (ref 6–20)
BUN/CREAT SERPL: 16 (ref 12–20)
CALCIUM SERPL-MCNC: 9.4 MG/DL (ref 8.5–10.1)
CHLORIDE SERPL-SCNC: 103 MMOL/L (ref 97–108)
CHOLEST SERPL-MCNC: 188 MG/DL
CO2 SERPL-SCNC: 30 MMOL/L (ref 21–32)
CREAT SERPL-MCNC: 0.85 MG/DL (ref 0.55–1.02)
DIFFERENTIAL METHOD BLD: ABNORMAL
EOSINOPHIL # BLD: 0.02 K/UL (ref 0–0.4)
EOSINOPHIL NFR BLD: 0.4 % (ref 0–0.7)
ERYTHROCYTE [DISTWIDTH] IN BLOOD BY AUTOMATED COUNT: 13.6 % (ref 11.5–14.5)
EST. AVERAGE GLUCOSE BLD GHB EST-MCNC: 117 MG/DL
GLOBULIN SER CALC-MCNC: 4.8 G/DL (ref 2–4)
GLUCOSE SERPL-MCNC: 93 MG/DL (ref 65–100)
HBA1C MFR BLD: 5.7 % (ref 4–5.6)
HCT VFR BLD AUTO: 46 % (ref 35–47)
HDLC SERPL-MCNC: 44 MG/DL
HDLC SERPL: 4.3 (ref 0–5)
HGB BLD-MCNC: 14.5 G/DL (ref 11.5–16)
IMM GRANULOCYTES # BLD AUTO: 0.03 K/UL (ref 0–0.04)
IMM GRANULOCYTES NFR BLD AUTO: 0.6 % (ref 0–0.5)
LDLC SERPL CALC-MCNC: 123.2 MG/DL (ref 0–100)
LYMPHOCYTES # BLD: 2.1 K/UL (ref 0.8–3.5)
LYMPHOCYTES NFR BLD: 44.8 % (ref 12–49)
MCH RBC QN AUTO: 28 PG (ref 26–34)
MCHC RBC AUTO-ENTMCNC: 31.5 G/DL (ref 30–36.5)
MCV RBC AUTO: 88.8 FL (ref 80–99)
MONOCYTES # BLD: 0.49 K/UL (ref 0–1)
MONOCYTES NFR BLD: 10.4 % (ref 5–13)
NEUTS SEG # BLD: 2.04 K/UL (ref 1.8–8)
NEUTS SEG NFR BLD: 43.6 % (ref 32–75)
NRBC # BLD: 0 K/UL (ref 0–0.01)
NRBC BLD-RTO: 0 PER 100 WBC
PLATELET # BLD AUTO: 262 K/UL (ref 150–400)
PMV BLD AUTO: 10.6 FL (ref 8.9–12.9)
POTASSIUM SERPL-SCNC: 3.8 MMOL/L (ref 3.5–5.1)
PROT SERPL-MCNC: 8.6 G/DL (ref 6.4–8.2)
RBC # BLD AUTO: 5.18 M/UL (ref 3.8–5.2)
SODIUM SERPL-SCNC: 142 MMOL/L (ref 136–145)
TRIGL SERPL-MCNC: 104 MG/DL
TSH SERPL DL<=0.05 MIU/L-ACNC: 1.79 UIU/ML (ref 0.36–3.74)
VIT B12 SERPL-MCNC: 532 PG/ML (ref 193–986)
VLDLC SERPL CALC-MCNC: 20.8 MG/DL
WBC # BLD AUTO: 4.7 K/UL (ref 3.6–11)

## 2025-01-15 PROCEDURE — 80053 COMPREHEN METABOLIC PANEL: CPT

## 2025-01-15 PROCEDURE — 85025 COMPLETE CBC W/AUTO DIFF WBC: CPT

## 2025-01-15 PROCEDURE — 83036 HEMOGLOBIN GLYCOSYLATED A1C: CPT

## 2025-01-15 PROCEDURE — 82607 VITAMIN B-12: CPT

## 2025-01-15 PROCEDURE — 80061 LIPID PANEL: CPT

## 2025-01-15 PROCEDURE — 84443 ASSAY THYROID STIM HORMONE: CPT

## 2025-06-28 NOTE — PROGRESS NOTES
.Mailed letter to pt [facility] Medicine  Progress Note    Patient Name: Nam William  MRN: [id number]  Patient Class: OP- Observation   Admission Date: 6/12/2025  Length of Stay: 0 days  Attending Physician: No att. providers found  Primary Care Provider: Alejandro Tripp MD        Subjective     Principal Problem:Dyspnea on exertion        HPI:  Nam William is a chronically ill 66-year-old female that presented to the emergency department today for the evaluation of shortness of breath.  The patient presented to the emergency department upon the advice of her primary care physician.  Apparently, she was being evaluated today for persistent shortness of breath.  She was assessed at the clinic and was found to be hypotensive; blood pressure was reportedly noted at 88/60.  The patient was then transported to the emergency department for further evaluation.    Overview/Hospital Course:  No notes on file    Interval history:  Patient seen at bedside.  No distress noted.  Plans for left heart catheterization today.  Appreciate Cardiology.  We will follow    Review of Systems   Constitutional:  Negative for fatigue.   HENT:  Negative for trouble swallowing.    Respiratory:  Negative for cough and shortness of breath.    Cardiovascular:  Negative for chest pain.   Gastrointestinal:  Negative for diarrhea, nausea and vomiting.   Genitourinary:  Negative for difficulty urinating and hematuria.   Musculoskeletal:  Negative for myalgias.   Neurological:  Negative for weakness.   Psychiatric/Behavioral:  Negative for confusion.      Objective:     Vital Signs (Most Recent):  Temp: 97.4 °F (36.3 °C) (06/14/25 0720)  Pulse: 82 (06/14/25 0752)  Resp: 17 (06/14/25 0752)  BP: (!) 141/69 (06/14/25 0720)  SpO2: 98 % (06/14/25 0752) Vital Signs (24h Range):        Weight: 93.9 kg (207 lb)  Body mass index is 36.67 kg/m².     Physical Exam  Vitals and nursing note reviewed.   Constitutional:       Appearance: Normal appearance.   HENT:      " Head: Normocephalic and atraumatic.      Right Ear: Tympanic membrane normal.      Nose: Nose normal.   Eyes:      Extraocular Movements: Extraocular movements intact.   Cardiovascular:      Rate and Rhythm: Normal rate and regular rhythm.      Pulses: Normal pulses.   Pulmonary:      Effort: Pulmonary effort is normal.      Breath sounds: Normal breath sounds.   Abdominal:      General: Abdomen is flat. Bowel sounds are normal.      Palpations: Abdomen is soft.   Musculoskeletal:         General: Normal range of motion.      Cervical back: Normal range of motion and neck supple.   Skin:     General: Skin is warm and dry.      Capillary Refill: Capillary refill takes less than 2 seconds.   Neurological:      General: No focal deficit present.      Mental Status: She is alert.   Psychiatric:         Mood and Affect: Mood normal.                Significant Labs: All pertinent labs within the past 24 hours have been reviewed.  Recent Lab Results       None            Significant Imaging: I have reviewed all pertinent imaging results/findings within the past 24 hours.  I have reviewed and interpreted all pertinent imaging results/findings within the past 24 hours.      Assessment & Plan  Diabetes mellitus  Patient's FSGs are uncontrolled due to hyperglycemia on current medication regimen.  Last A1c reviewed-   Lab Results   Component Value Date    HGBA1C 7.2 (H) 06/12/2025     Most recent fingerstick glucose reviewed- No results for input(s): "POCTGLUCOSE" in the last 24 hours.  Current correctional scale  Medium  Maintain anti-hyperglycemic dose as follows-   Antihyperglycemics (From admission, onward)      None          Hold Oral hypoglycemics while patient is in the hospital.  Hyperlipidemia  -Resume home medications    Hypertension  Patient's blood pressure range in the last 24 hours was: No data recorded.The patient's inpatient anti-hypertensive regimen is listed below:  Current Antihypertensives       Plan  - BP " is controlled, no changes needed to their regimen  - Resume home medications  Severe obesity (BMI 35.0-39.9) with comorbidity  Body mass index is 36.67 kg/m². Morbid obesity complicates all aspects of disease management from diagnostic modalities to treatment. Weight loss encouraged and health benefits explained to patient.       VTE Risk Mitigation (From admission, onward)           Ordered     IP VTE HIGH RISK PATIENT  Once         06/12/25 2027                    Discharge Planning   PRATIBHA: 6/14/2025     Code Status: Prior   Medical Readiness for Discharge Date: 6/14/2025  Discharge Plan A: Home   Discharge Delays: None known at this time      SDOH Screening:  The patient was screened for food insecurity, housing instability, transportation needs, utility difficulties, and interpersonal safety. The social determinant(s) of health identified as a concern this admission are:  Interpersonal Safety    Will not discuss with case management and/or community health workers.                           Rishi Belle NP  Department of Hospital Medicine   [facility]